# Patient Record
Sex: MALE | Race: WHITE | HISPANIC OR LATINO | Employment: OTHER | URBAN - METROPOLITAN AREA
[De-identification: names, ages, dates, MRNs, and addresses within clinical notes are randomized per-mention and may not be internally consistent; named-entity substitution may affect disease eponyms.]

---

## 2017-01-04 ENCOUNTER — GENERIC CONVERSION - ENCOUNTER (OUTPATIENT)
Dept: OTHER | Facility: OTHER | Age: 62
End: 2017-01-04

## 2017-01-11 ENCOUNTER — ALLSCRIPTS OFFICE VISIT (OUTPATIENT)
Dept: OTHER | Facility: OTHER | Age: 62
End: 2017-01-11

## 2017-03-22 ENCOUNTER — GENERIC CONVERSION - ENCOUNTER (OUTPATIENT)
Dept: OTHER | Facility: OTHER | Age: 62
End: 2017-03-22

## 2017-03-23 ENCOUNTER — GENERIC CONVERSION - ENCOUNTER (OUTPATIENT)
Dept: OTHER | Facility: OTHER | Age: 62
End: 2017-03-23

## 2017-05-03 ENCOUNTER — ALLSCRIPTS OFFICE VISIT (OUTPATIENT)
Dept: OTHER | Facility: OTHER | Age: 62
End: 2017-05-03

## 2017-08-04 ENCOUNTER — ALLSCRIPTS OFFICE VISIT (OUTPATIENT)
Dept: OTHER | Facility: OTHER | Age: 62
End: 2017-08-04

## 2017-08-04 DIAGNOSIS — I10 ESSENTIAL (PRIMARY) HYPERTENSION: ICD-10-CM

## 2017-08-04 DIAGNOSIS — E05.90 THYROTOXICOSIS WITHOUT THYROID STORM: ICD-10-CM

## 2017-08-04 DIAGNOSIS — I50.9 HEART FAILURE (HCC): ICD-10-CM

## 2017-08-04 DIAGNOSIS — N18.6 END STAGE RENAL DISEASE (HCC): ICD-10-CM

## 2017-08-04 DIAGNOSIS — Z99.2 DEPENDENCE ON RENAL DIALYSIS (HCC): ICD-10-CM

## 2017-08-04 DIAGNOSIS — E78.5 HYPERLIPIDEMIA: ICD-10-CM

## 2017-08-04 DIAGNOSIS — Z00.00 ENCOUNTER FOR GENERAL ADULT MEDICAL EXAMINATION WITHOUT ABNORMAL FINDINGS: ICD-10-CM

## 2017-08-04 DIAGNOSIS — I48.91 ATRIAL FIBRILLATION (HCC): ICD-10-CM

## 2017-09-22 ENCOUNTER — HOSPITAL ENCOUNTER (OUTPATIENT)
Dept: RADIOLOGY | Facility: HOSPITAL | Age: 62
Discharge: HOME | End: 2017-09-22
Attending: ANESTHESIOLOGY
Payer: MEDICARE

## 2017-09-22 ENCOUNTER — TRANSCRIBE ORDERS (OUTPATIENT)
Dept: ADMINISTRATIVE | Facility: HOSPITAL | Age: 62
End: 2017-09-22

## 2017-09-22 ENCOUNTER — ALLSCRIPTS OFFICE VISIT (OUTPATIENT)
Dept: OTHER | Facility: OTHER | Age: 62
End: 2017-09-22

## 2017-09-22 DIAGNOSIS — M25.562 PAIN IN LEFT KNEE: ICD-10-CM

## 2017-09-22 DIAGNOSIS — M25.511 PAIN IN RIGHT SHOULDER: ICD-10-CM

## 2017-09-22 DIAGNOSIS — M25.512 PAIN IN LEFT SHOULDER: ICD-10-CM

## 2017-09-22 DIAGNOSIS — M25.561 PAIN IN RIGHT KNEE: ICD-10-CM

## 2017-09-22 PROCEDURE — 73562 X-RAY EXAM OF KNEE 3: CPT

## 2017-09-22 PROCEDURE — 73030 X-RAY EXAM OF SHOULDER: CPT

## 2017-09-25 ENCOUNTER — TRANSCRIBE ORDERS (OUTPATIENT)
Dept: ADMINISTRATIVE | Facility: HOSPITAL | Age: 62
End: 2017-09-25

## 2017-09-25 DIAGNOSIS — M47.816 OSTEOARTHRITIS OF LUMBAR SPINE, UNSPECIFIED SPINAL OSTEOARTHRITIS COMPLICATION STATUS: Primary | ICD-10-CM

## 2017-09-29 ENCOUNTER — GENERIC CONVERSION - ENCOUNTER (OUTPATIENT)
Dept: OTHER | Facility: OTHER | Age: 62
End: 2017-09-29

## 2017-10-02 ENCOUNTER — HOSPITAL ENCOUNTER (OUTPATIENT)
Dept: RADIOLOGY | Facility: HOSPITAL | Age: 62
Discharge: HOME | End: 2017-10-02
Attending: ANESTHESIOLOGY
Payer: MEDICARE

## 2017-10-02 DIAGNOSIS — M47.816 OSTEOARTHRITIS OF LUMBAR SPINE, UNSPECIFIED SPINAL OSTEOARTHRITIS COMPLICATION STATUS: ICD-10-CM

## 2017-10-02 PROCEDURE — 72148 MRI LUMBAR SPINE W/O DYE: CPT

## 2017-10-13 ENCOUNTER — GENERIC CONVERSION - ENCOUNTER (OUTPATIENT)
Dept: OTHER | Facility: OTHER | Age: 62
End: 2017-10-13

## 2017-10-16 ENCOUNTER — ALLSCRIPTS OFFICE VISIT (OUTPATIENT)
Dept: OTHER | Facility: OTHER | Age: 62
End: 2017-10-16

## 2017-10-17 NOTE — PROGRESS NOTES
Assessment  1  Lumbosacral spondylosis with radiculopathy (721 3,724 4) (M47 27)   2  Osteoarthritis of right knee (715 96) (M17 11)   3  Osteoarthritis of right shoulder (715 91) (M19 011)   4  Osteoarthritis of left knee (715 96) (M17 12)    Discussion/Summary    Patient is a 66-year-old male with a history of lumbar spondylosis, lumbar radiculopathy, and bilateral knee pain and right shoulder pain, who presents today for a follow up appointment  The patient is experiencing low back pain which is consistent with multilevel spondylosis which is most significant at L4-L5  To help decrease inflammation and nerve irritation, we can perform a lumbar translaminar epidural steroid injection at L4  The injection and risks were reviewed with the patient  He is currently taking Coumadin, so a Coumadin hold will need to be obtained prior to scheduling the procedure  also receives dialysis on Tuesdays and Thursdays, so the injection will need to be scheduled either Friday  The patient has the current Goals: Decreased pain and improved quality of life  The patent has the current Barriers: None  Patient is able to Self-Care  Chief Complaint  1  Pain    History of Present Illness  Patient is a 66-year-old male with a history of lumbar spondylosis, lumbar radiculopathy, and bilateral knee pain and right shoulder pain  He was last seen in the office on September 22, 2017, which was initial consultation  He was ordered MRI of the lumbar spine along with x-rays of the right shoulder and bilateral knees  He presents today for a follow-up appointment  At this time, the patient continues to experience constant low back pain  He states the pain only occurs when standing or walking  He also feels numbness in the lateral aspects of his thighs, along with knee pain  The left knee is more significant than the right knee  he is currently rating his pain a 6/10 on the numeric rating scale     of bilateral knees showed mild osteoarthritis     EDU CURIEL presents with complaints of gradual onset of moderate bilateral neck, bilateral lower back, bilateral shoulder, bilateral knee and bilateral ankle pain  Review of Systems    Musculoskeletal: difficulty walking  Active Problems  1  Allergic rhinitis (477 9) (J30 9)   2  Anticoagulant long-term use (V58 61) (Z79 01)   3  Arteriosclerosis of coronary artery (414 00) (I25 10)   4  Asthma (493 90) (J45 909)   5  Atrial fibrillation (427 31) (I48 91)   6  Chronic left shoulder pain (719 41,338 29) (M25 512,G89 29)   7  Chronic pain of left knee (572 42,460 13) (M25 562,G89 29)   8  Chronic pain of right knee (492 57,694 66) (M25 561,G89 29)   9  Chronic right shoulder pain (719 41,338 29) (M25 511,G89 29)   10  Chronic sinusitis (473 9) (J32 9)   11  Congestive heart failure (428 0) (I50 9)   12  Dental caries secondary to acquired defects of tooth structure (521 09) (K03 2,K02 9)   13  End stage renal disease (585 6) (N18 6)   14  Facet degeneration of lumbar region (721 3) (M47 816)   15  Fatigue (780 79) (R53 83)   16  Foreign body of right ear, initial encounter (436 2743) (T16 1XXA)   17  Generalized osteoarthritis of multiple sites (715 09) (M15 9)   18  Hemodialysis status (V45 11) (Z99 2)   19  Hyperlipidemia (272 4) (E78 5)   20  Hypertension (401 9) (I10)   21  Hyperthyroidism (242 90) (E05 90)   22  Ischemic cardiomyopathy (414 8) (I25 5)   23  Membranous Glomerulonephritis With Nephritis (583 1)   24  Membranous Nephropathy (583 1)   25  Mitral regurgitation (424 0) (I34 0)   26  Monocular diplopia, right eye (368 2) (H53 2)   27  Nasal septal deviation (470) (J34 2)   28  Obesity (278 00) (E66 9)   29  Sleep apnea (780 57) (G47 30)   30  Tobacco use (305 1) (Z72 0)   31  Trigger finger (727 03) (M65 30)   32  Vitamin D deficiency (268 9) (E55 9)    Past Medical History  1  History of Acute bilateral otitis media (382 9) (H62 93)   2   History of Acute Myocardial Infarction (V12 59)   3  History of Ear discharge, right (388 60) (H92 11)   4  History of acute pancreatitis (V12 79) (Z87 19)   5  History of cholelithiasis (V12 79) (Z87 19)   6  History of Laceration of eye, right (871 4) (S05 31XA)   7  History of Limb swelling (729 81) (M79 89)   8  History of Preoperative clearance (V72 84) (Z01 818)   9  History of Preoperative clearance (V72 84) (Z01 818)   10  History of Ptosis, right eyelid (374 30) (H02 401)   11  History of Right ear pain (388 70) (H92 01)   12  History of Stroke Syndrome (436)    The active problems and past medical history were reviewed and updated today  Surgical History  1  History of Cath Placement Of Stent 1   2  History of Cholecystectomy Laparoscopic   3  History of Hemodialysis Access Type Arteriovenous Fistula   4  History of Hernia Repair   5  History of Parathyroid Resection    The surgical history was reviewed and updated today  Family History  Father    1  Family history of Bacterial Pneumonia   2  Family history of Coronary Artery Disease (V17 49)  Brother    3  Family history of End Stage Renal Disease   4  Family history of Hypertension (V17 49)  Family History    5  Family history of Death In The Family Brother    The family history was reviewed and updated today  Social History   · Current every day smoker (305 1) (F17 200)   · Denied: History of Drug Use   · Marital History -  (V61 03)   · Native Language Frisian   · Never Drank Alcohol   · Occupation:   · Tobacco use (305 1) (Z72 0)  The social history was reviewed and updated today  The social history was reviewed and is unchanged  Current Meds   1  AmLODIPine Besylate 5 MG Oral Tablet; TAKE 1 TABLET DAILY FOR BLOOD   PRESSURE; Therapy: 29IGP7159 to Recorded   2  Arthritis Pain 650 MG Oral Tablet Extended Release; TAKE 1 TABLET Bedtime; Therapy: 93EAO6754 to (Last Rx:95Afb6880) Ordered   3   Aspirin EC 81 MG Oral Tablet Delayed Release; take one tablet by mouth one time daily; Therapy: 75HYN9287 to (Last EL:46AZU4466) Ordered   4  Atorvastatin Calcium 40 MG Oral Tablet; Take 1 tablet by mouth at bedtime; Therapy: 41MQO7544 to (GKODSGOW:41RIC5706)  Requested for: 95ZEB2201; Last   Rx:77Mva2117 Ordered   5  Ergocalciferol 40276 UNIT CAPS; TAKE 1 CAPSULE Weekly; Therapy: 00ROF7116 to Recorded   6  Fluticasone Propionate 50 MCG/ACT Nasal Suspension; USE 2 SPRAYS IN EACH   NOSTRIL ONCE DAILY; Therapy: 59DPP8138 to (Evaluate:26Mqm6758)  Requested for: 11Jun2013; Last   Rx:11Jun2013 Ordered   7  Lisinopril 20 MG Oral Tablet; Take 1 tablet daily; Therapy: 92FHU8806 to Recorded   8  Metoprolol Tartrate 25 MG Oral Tablet; TAKE 1 TABLET TWICE DAILY; Therapy: 79MJR8611 to (Evaluate:17Jan2017); Last Rx:01Spl1519 Ordered   9  Nephrocaps 1 MG Oral Capsule; Therapy: 43LBO7719 to Recorded   10  Oscal 500/200 D-3 500-200 MG-UNIT Oral Tablet; Therapy: 80BDJ4622 to Recorded   11  Phoslyra 667 MG/5ML Oral Solution; Therapy: 20KEJ6138 to Recorded   12  Renvela 800 MG Oral Tablet; Therapy: 27ECW4169 to Recorded   13  Rocaltrol 0 5 MCG Oral Capsule; Therapy: 96TCY2793 to Recorded   14  Sensipar 60 MG Oral Tablet; Therapy: 34HFC8172 to Recorded   15  Warfarin Sodium 5 MG Oral Tablet; take 2 tablet daily; Last JQ:19NAH8165 Ordered    The medication list was reviewed and updated today  Allergies  1  PPD    Vitals  Vital Signs    Recorded: 57DQV7988 02:47PM   Temperature 98 2 F   Heart Rate 76   Respiration 18   Systolic 419   Diastolic 70   Height 5 ft 6 in   Weight 227 lb    BMI Calculated 36 64   BSA Calculated 2 11   O2 Saturation 97   Pain Scale 6     Physical Exam    Constitutional   General appearance: Well developed, well nourished, alert, in no distress, non-toxic and no overt pain behavior  Eyes   Sclera: anicteric   HEENT   Hearing grossly intact  Neck   Neck: Supple, symmetric, trachea midline, no masses      Pulmonary   Respiratory effort: Even and unlabored  Cardiovascular   Examination of extremities: No edema or pitting edema present  Skin   Skin and subcutaneous tissue: Normal without rashes or lesions, well hydrated  Psychiatric   Mood and affect: Mood and affect appropriate  Neurologic   Cranial nerves: Cranial nerves II-XII grossly intact  Musculoskeletal   Gait and station: Normal     Joint Exam: -- (no tenderness bilateral knees to palpation  5/5 strength b/l knees)   Lumbar/Sacral Spine examination demonstrates Lumbosacral Spine:   Appearance: Normal  Spinal alignment exhibits normal lordosis  Tenderness: None  Flexion was restricted-- and-- was painless  Extension was restricted-- and-- was painful  Left lateral flexion was restricted-- and-- was painful  Right lateral flexion was restricted-- and-- was painful  Foot and ankle strength was normal bilaterally  Knee strength was normal bilaterally  Hip strength was normal bilaterally  Results/Data  Results Free Text Form [facility]:   Results    I personally reviewed the films/images in the office today  MRI:  MRI LUMBAR SPINE WITHOUT CONTRAST dated 10/2/17    INDICATION: Osteoarthritis, spondylosis without myelopathy or radiculopathy    COMPARISON: None  TECHNIQUE: Sagittal T1, sagittal T2, sagittal inversion recovery, axial T1 and axial T2, coronal T2     IMAGE QUALITY: Diagnostic    FINDINGS:    ALIGNMENT: Minor degenerative anterolisthesis of L4  MARROW SIGNAL: Marrow signal distinctly abnormal with the superior endplate deformities present at T12, L1 and L2  There is a small amount of marrow edema associated with the T12-L1 fracture suggesting these are evolving  Overall marrow signal  demonstrates some mild sclerosis at the endplates consistent with renal disease  DISTAL CORD AND CONUS: Normal size and signal within the distal cord and conus  The conus ends at the L1 level      PARASPINAL SOFT TISSUES: Paraspinal soft tissues are unremarkable  SACRUM: Renal contours difficult to identify owing to the numerous bilateral renal cysts of varying size  Findings are consistent with polycystic kidney disease yet this was not described on a CT from 2005  Patient is on dialysis  Only as clinically  indicated, kidneys could be imaged with CT     LOWER THORACIC DISC SPACES: Normal disc height and signal  No disc herniation, canal stenosis or foraminal narrowing  LUMBAR DISC SPACES:    L1-L2: Minor facet arthrosis superior endplate L2 Schmorl's node    L2-L3: Minor facet arthrosis, minor circumferential bulge, minor depression of superior L3 endplate    D9-P7: Circumferential bulge, bilateral facet arthrosis  L4-L5: Moderately advanced facet arthrosis with minor anterolisthesis  No definite root compression  L5-S1: Circumferential bulge, marginal osteophytes  IMPRESSION:    No compressive cord or root disease  Appearance of the spine in keeping with hemodialysis  Superior endplate deformities P68, L1 and L2, minor associated marrow edema at T12 L1 suggesting evolving Schmorl's nodes  Polycystic kidney disease  Directed CT Imaging of the kidneys could be performed only as deemed clinically appropriate      Attending Note  Collaborating Physician: I discussed the case with the Advanced Practitioner and reviewed the note-- and-- I agree with the Advanced Practitioner note        Future Appointments    Date/Time Provider Specialty Site   12/15/2017 11:00 AM Sherlyn Segal MD Family [facility]     Signatures   Electronically signed by : Isaura Quach [address] [place] [address]; Oct 16 2017  3:36PM EST                       (Author)    Electronically signed by : Katheryn Juarez MD; Oct 16 2017  3:42PM EST                       (Author)

## 2017-10-19 ENCOUNTER — GENERIC CONVERSION - ENCOUNTER (OUTPATIENT)
Dept: OTHER | Facility: OTHER | Age: 62
End: 2017-10-19

## 2017-10-25 ENCOUNTER — GENERIC CONVERSION - ENCOUNTER (OUTPATIENT)
Dept: OTHER | Facility: OTHER | Age: 62
End: 2017-10-25

## 2017-11-01 ENCOUNTER — GENERIC CONVERSION - ENCOUNTER (OUTPATIENT)
Dept: OTHER | Facility: OTHER | Age: 62
End: 2017-11-01

## 2017-11-02 DIAGNOSIS — Z79.01 LONG TERM CURRENT USE OF ANTICOAGULANT: ICD-10-CM

## 2017-11-03 ENCOUNTER — ALLSCRIPTS OFFICE VISIT (OUTPATIENT)
Dept: RADIOLOGY | Facility: CLINIC | Age: 62
End: 2017-11-03
Payer: MEDICARE

## 2017-12-08 ENCOUNTER — ALLSCRIPTS OFFICE VISIT (OUTPATIENT)
Dept: OTHER | Facility: OTHER | Age: 62
End: 2017-12-08

## 2018-01-10 NOTE — MISCELLANEOUS
Message   Recorded as Task   Date: 10/13/2017 02:02 PM, Created By: Florin Jolley   Task Name: Follow Up   Assigned To: [facility] clerical,Team   Regarding Patient: Vincent Mendoza, Status: In Progress   Annabellediane Niño - 13 Oct 2017 2:02 PM     TASK CREATED  called pt to go over MRI results but he had a hard time understanding what i was saying    please schedule an SOVS with Elli Cruz to go over MRI results and discuss a treatment plan   Destinee Stuart - 13 Oct 2017 2:04 PM     TASK REASSIGNED: Previously Assigned To [address] - 13 Oct 2017 2:42 PM     TASK IN [address] [place] [address] - 13 Oct 2017 2:45 PM     TASK EDITED  Pt is scheduled w/Jessica for 10/16/17 @ 2:30  Active Problems    1  Allergic rhinitis (477 9) (J30 9)   2  Anticoagulant long-term use (V58 61) (Z79 01)   3  Arteriosclerosis of coronary artery (414 00) (I25 10)   4  Asthma (493 90) (J45 909)   5  Atrial fibrillation (427 31) (I48 91)   6  Chronic left shoulder pain (719 41,338 29) (M25 512,G89 29)   7  Chronic pain of left knee (806 34,301 57) (M25 562,G89 29)   8  Chronic pain of right knee (954 80,096 37) (M25 561,G89 29)   9  Chronic right shoulder pain (719 41,338 29) (M25 511,G89 29)   10  Chronic sinusitis (473 9) (J32 9)   11  Congestive heart failure (428 0) (I50 9)   12  Dental caries secondary to acquired defects of tooth structure (521 09) (K03 2,K02 9)   13  End stage renal disease (585 6) (N18 6)   14  Facet degeneration of lumbar region (721 3) (M47 816)   15  Fatigue (780 79) (R53 83)   16  Foreign body of right ear, initial encounter (436 2743) (T16 1XXA)   17  Generalized osteoarthritis of multiple sites (715 09) (M15 9)   18  Hemodialysis status (V45 11) (Z99 2)   19  Hyperlipidemia (272 4) (E78 5)   20  Hypertension (401 9) (I10)   21  Hyperthyroidism (242 90) (E05 90)   22  Ischemic cardiomyopathy (414 8) (I25 5)   23  Membranous Glomerulonephritis With Nephritis (583 1)   24  Membranous Nephropathy (583 1)   25  Mitral regurgitation (424 0) (I34 0)   26  Monocular diplopia, right eye (368 2) (H53 2)   27  Nasal septal deviation (470) (J34 2)   28  Obesity (278 00) (E66 9)   29  Sleep apnea (780 57) (G47 30)   30  Tobacco use (305 1) (Z72 0)   31  Trigger finger (727 03) (M65 30)   32  Vitamin D deficiency (268 9) (E55 9)    Current Meds   1  AmLODIPine Besylate 5 MG Oral Tablet; TAKE 1 TABLET DAILY FOR BLOOD   PRESSURE; Therapy: 35MHV6793 to Recorded   2  Arthritis Pain 650 MG Oral Tablet Extended Release; TAKE 1 TABLET Bedtime; Therapy: 04DJU3532 to (Last Rx:98Epf0607) Ordered   3  Aspirin EC 81 MG Oral Tablet Delayed Release; take one tablet by mouth one time daily; Therapy: 34ELW4550 to (Last LH:86YMN8340) Ordered   4  Atorvastatin Calcium 40 MG Oral Tablet; Take 1 tablet by mouth at bedtime; Therapy: 24FQE0890 to (SVNTKQGI:67WAJ4780)  Requested for: 93KPN4303; Last   Rx:86Erh9165 Ordered   5  Ergocalciferol 21928 UNIT CAPS; TAKE 1 CAPSULE Weekly; Therapy: 24WAV7773 to Recorded   6  Fluticasone Propionate 50 MCG/ACT Nasal Suspension (Flonase); USE 2 SPRAYS IN   EACH NOSTRIL ONCE DAILY; Therapy: 86KJN2370 to (Evaluate:08Rhs2787)  Requested for: 11Jun2013; Last   Rx:11Jun2013 Ordered   7  Lisinopril 20 MG Oral Tablet; Take 1 tablet daily; Therapy: 49RMT9168 to Recorded   8  Metoprolol Tartrate 25 MG Oral Tablet; TAKE 1 TABLET TWICE DAILY; Therapy: 31PEC3659 to (Evaluate:17Jan2017); Last Rx:19Oct2016 Ordered   9  Nephrocaps 1 MG Oral Capsule; Therapy: 48EZD8035 to Recorded   10  Oscal 500/200 D-3 500-200 MG-UNIT Oral Tablet; Therapy: 24BLU5104 to Recorded   11  Phoslyra 667 MG/5ML Oral Solution; Therapy: 27BHT8860 to Recorded   12  Renvela 800 MG Oral Tablet (Sevelamer Carbonate); Therapy: 25YTQ4777 to Recorded   13  Rocaltrol 0 5 MCG Oral Capsule (Calcitriol); Therapy: 44REP6653 to Recorded   14  Sensipar 60 MG Oral Tablet;     Therapy: 40WSQ2844 to Recorded   15  Warfarin Sodium 5 MG Oral Tablet; take 2 tablet daily; Last RP:17ZTA2153 Ordered    Allergies    1   PPD    Signatures   Electronically signed by : Edgard Jang, ; Oct 13 2017  2:46PM EST                       (Author)

## 2018-01-12 VITALS
DIASTOLIC BLOOD PRESSURE: 66 MMHG | OXYGEN SATURATION: 98 % | HEIGHT: 66 IN | SYSTOLIC BLOOD PRESSURE: 114 MMHG | WEIGHT: 228 LBS | BODY MASS INDEX: 36.64 KG/M2 | TEMPERATURE: 97.8 F | RESPIRATION RATE: 18 BRPM | HEART RATE: 74 BPM

## 2018-01-13 VITALS
WEIGHT: 234.25 LBS | BODY MASS INDEX: 37.65 KG/M2 | HEART RATE: 88 BPM | HEIGHT: 66 IN | RESPIRATION RATE: 20 BRPM | SYSTOLIC BLOOD PRESSURE: 110 MMHG | TEMPERATURE: 97.1 F | DIASTOLIC BLOOD PRESSURE: 60 MMHG

## 2018-01-13 VITALS
HEART RATE: 72 BPM | SYSTOLIC BLOOD PRESSURE: 112 MMHG | HEIGHT: 66 IN | DIASTOLIC BLOOD PRESSURE: 64 MMHG | BODY MASS INDEX: 37.32 KG/M2 | WEIGHT: 232.2 LBS

## 2018-01-13 VITALS
BODY MASS INDEX: 36.87 KG/M2 | HEART RATE: 72 BPM | HEIGHT: 66 IN | DIASTOLIC BLOOD PRESSURE: 60 MMHG | WEIGHT: 229.4 LBS | SYSTOLIC BLOOD PRESSURE: 110 MMHG

## 2018-01-13 NOTE — MISCELLANEOUS
Message   Recorded as Task   Date: 10/31/2017 03:12 PM, Created By: Marcus Corbin   Task Name: Miscellaneous   Assigned To: Edilma Simons clinical,Team   Regarding Patient: Orlando Plummer, Status: Active   Comment:    Marcus Corbin - 31 Oct 2017 3:12 PM     TASK CREATED  Melvin Shaffer from [address] [place] [address] Dr richard stating that pt brought in a script for bloodwork and they neeru PT with INR today  Pt is scheduled for procedure on Friday (LESI @L4,COUMADIN,NO ASA HOLD PER FQ)  The order says to draw on  when the pt is at dialysis  Is it necessary for him to go back and have it drawn then or will the results from today be sufficient  She will have the results faxed over tomorrow  She can be reached at [phone]  Can speak with any of the nurses there  Hellen Arndt - 31 Oct 2017 4:25 PM     TASK EDITED    Given to:             [facility]    Reason: ROUTINE/OFFICE   Pt's Dr: Ashutosh Doss       For: OFFICE     2nd Call: NO        From: [address] [place] [address]     Phone: [phone]   Ext:     Pt Name: Gurdeep CURIEL    Pt : 1955     Message: NEED TO KNOW WHO SHE JUST SPOKE WITH REG LAB WORK PLS C/B    returned call to Melvin Shaffer to advise who she had spoken with earlier  Nael Bell - 38 Oct 2017 4:28 PM     TASK REASSIGNED: Previously Assigned To Yani Check - 2017 10:21 AM     TASK EDITED  S/W Beatris at [facility], I asked her to fax me the PT/INR that was drawn yest at their center  I explained that I will forward results to Dr Raoul Philip and based on the results maybe they might not have to repeat the PT/INR on Thurs when he is there for dialysis  I asked to have results faxed to [phone] the fax # at the [facility] office where I am working today  Told Beatris I will c/b and let them know if the PT/INR needs to be done again on 17  PT/INR from 10/31/17: PT12 2, INR 0 93  Results scanned into chart    Pt is on coumadin (but non-compliant) and is sched for LESI on 11/3/17  Pls advise  Mg David - 01 Nov 2017 12:17 PM     TASK REPLIED TO: Previously Assigned To Mg David  yes can continue with scheduled procedure since he's off his coumadin anyway now and had normal INR   NarcisoDestinee - 01 Nov 2017 1:44 PM     TASK EDITED  I informed Beatris at [facility] that the PT/INR does not need to be repeated on Thurs since pt had a normal INR  Active Problems    1  Allergic rhinitis (477 9) (J30 9)   2  Anticoagulant long-term use (V58 61) (Z79 01)   3  Arteriosclerosis of coronary artery (414 00) (I25 10)   4  Asthma (493 90) (J45 909)   5  Atrial fibrillation (427 31) (I48 91)   6  Chronic left shoulder pain (719 41,338 29) (M25 512,G89 29)   7  Chronic pain of left knee (801 80,615 63) (M25 562,G89 29)   8  Chronic pain of right knee (011 86,362 02) (M25 561,G89 29)   9  Chronic right shoulder pain (719 41,338 29) (M25 511,G89 29)   10  Chronic sinusitis (473 9) (J32 9)   11  Congestive heart failure (428 0) (I50 9)   12  Dental caries secondary to acquired defects of tooth structure (521 09) (K03 2,K02 9)   13  End stage renal disease (585 6) (N18 6)   14  Facet degeneration of lumbar region (721 3) (M47 816)   15  Fatigue (780 79) (R53 83)   16  Foreign body of right ear, initial encounter (436 2743) (T16 1XXA)   17  Generalized osteoarthritis of multiple sites (715 09) (M15 9)   18  Hemodialysis status (V45 11) (Z99 2)   19  Hyperlipidemia (272 4) (E78 5)   20  Hypertension (401 9) (I10)   21  Hyperthyroidism (242 90) (E05 90)   22  Ischemic cardiomyopathy (414 8) (I25 5)   23  Knee osteomyelitis, left (730 26) (M86 9)   24  Lumbosacral spondylosis with radiculopathy (721 3,724 4) (M47 27)   25  Membranous Glomerulonephritis With Nephritis (583 1)   26  Membranous Nephropathy (583 1)   27  Mitral regurgitation (424 0) (I34 0)   28  Monocular diplopia, right eye (368 2) (H53 2)   29  Nasal septal deviation (470) (J34 2)   30   Obesity (278 00) (E66 9)   31  Osteoarthritis of left knee (715 96) (M17 12)   32  Osteoarthritis of right knee (715 96) (M17 11)   33  Osteoarthritis of right shoulder (715 91) (M19 011)   34  Sleep apnea (780 57) (G47 30)   35  Tobacco use (305 1) (Z72 0)   36  Trigger finger (727 03) (M65 30)   37  Vitamin D deficiency (268 9) (E55 9)    Current Meds   1  AmLODIPine Besylate 5 MG Oral Tablet; TAKE 1 TABLET DAILY FOR BLOOD   PRESSURE; Therapy: 38JOU4076 to Recorded   2  Arthritis Pain 650 MG Oral Tablet Extended Release; TAKE 1 TABLET Bedtime; Therapy: 30ZBM7665 to (Last Rx:13Swo2071) Ordered   3  Aspirin EC 81 MG Oral Tablet Delayed Release; take one tablet by mouth one time daily; Therapy: 05XRF4185 to (Last GK:84GQF5258) Ordered   4  Atorvastatin Calcium 40 MG Oral Tablet; Take 1 tablet by mouth at bedtime; Therapy: 64NBK5083 to (VBJBUBQZ:33MQB4681)  Requested for: 12KPE3267; Last   Rx:36Lvr3656 Ordered   5  Ergocalciferol 78694 UNIT CAPS; TAKE 1 CAPSULE Weekly; Therapy: 53BJO9011 to Recorded   6  Fluticasone Propionate 50 MCG/ACT Nasal Suspension (Flonase); USE 2 SPRAYS IN   EACH NOSTRIL ONCE DAILY; Therapy: 20VHI6890 to (Evaluate:68Say1297)  Requested for: 11Jun2013; Last   Rx:11Jun2013 Ordered   7  Lisinopril 20 MG Oral Tablet; Take 1 tablet daily; Therapy: 09XPZ0217 to Recorded   8  Metoprolol Tartrate 25 MG Oral Tablet; TAKE 1 TABLET TWICE DAILY; Therapy: 72QPH5840 to (Evaluate:17Jan2017); Last Rx:19Oct2016 Ordered   9  Nephrocaps 1 MG Oral Capsule; Therapy: 79ZXK9394 to Recorded   10  Oscal 500/200 D-3 500-200 MG-UNIT Oral Tablet; Therapy: 55FXS8621 to Recorded   11  Phoslyra 667 MG/5ML Oral Solution; Therapy: 47PPJ1095 to Recorded   12  Renvela 800 MG Oral Tablet (Sevelamer Carbonate); Therapy: 68UIA3938 to Recorded   13  Rocaltrol 0 5 MCG Oral Capsule (Calcitriol); Therapy: 14OIR5301 to Recorded   14  Sensipar 60 MG Oral Tablet; Therapy: 80XKR3553 to Recorded   15   Warfarin Sodium 5 MG Oral Tablet; take 2 tablet daily; Last BK:55QRD2899 Ordered    Allergies    1   PPD    Signatures   Electronically signed by : Rachel Sifuentes, ; Nov 1 2017  1:44PM EST                       (Author)

## 2018-01-14 VITALS
RESPIRATION RATE: 18 BRPM | HEIGHT: 66 IN | DIASTOLIC BLOOD PRESSURE: 70 MMHG | OXYGEN SATURATION: 97 % | SYSTOLIC BLOOD PRESSURE: 118 MMHG | BODY MASS INDEX: 36.48 KG/M2 | WEIGHT: 227 LBS | TEMPERATURE: 98.2 F | HEART RATE: 76 BPM

## 2018-01-15 NOTE — MISCELLANEOUS
Message   Recorded as Task   Date: 10/19/2017 08:57 AM, Created By: Fantasma Reeves   Task Name: Call Back   Assigned To: Anti Renee CLOUD,TEAM   Regarding Patient: Donavon Fernandez, Status: Active   CommentDella Didier - 19 Oct 2017 8:57 AM     TASK CREATED  SPA Call Center- Patient called stating that Q06 wanted the number to Dr Rob Mendenhall [phone] to s/w this doctor to ask him questions in regards to patient having any heart problems  Would like someone to call him back after Q06 s/w Dr Rob Mendenhall to schedule an appt  c/b [phone]   Lazaro Patton - 19 Oct 2017 10:53 AM     TASK EDITED  Please advise  Thanks  Marvin Pulido - 49 Oct 2017 12:15 PM     TASK REASSIGNED: Previously Assigned To Marvin Pulido  this is for an aspirin hold   [facility] - 19 Oct 2017 1:10 PM     TASK EDITED  Coumadin and ASA hold form faxed to Dr Rob Mendenhall at [facility]  Fax # [phone]  Pt informed we faxed form to his cardiologist, nurse will call him once approval obtained to sched the inj     *pt has dialysis on [facility] [address] [facility], pt needs LESI @ L4 scheduled on a Friday once approval obtained  Harika Mcleod - 24 Oct 2017 3:53 PM     TASK EDITED  Anti Coag form scanned into patient's chart   Destinee Stuart - 25 Oct 2017 7:54 AM     TASK EDITED  We received a faxed note dated 10/23/17 from Dr Rob Mendenhall from [facility] regarding Coumadin & ASA hold for procedure, note states "Moderate risk of stroke & he is largely noncompliant w/ coumadin & INR is chronically subtherapeutic  Can hold his coumadin for 5 days  Would strongly prefer for him to remain on ASA given Hx of PCI  If absolutely necessary to stop, he may stop ASA for 6 days & resume both as soon as safe from procedure standpoint "    Pls advise     Marvin Pulido - 25 Oct 2017 8:19 AM     TASK REPLIED TO: Previously Assigned To Marvin Pulido  ok for him to stay on aspirin for procedure   Destinee Stuart - 25 Oct 2017 9:57 AM     TASK EDITED  S/W pt and scheduled his LESI @ L4 for Friday 11/3/17 at 12:45 at [facility]  Arrival time is 12:30,  needed, NPO 1 Hr prior to procedure, c/b needed if sick/abx started prior to opro, wear sweat pants, ASA is not to be held per Dr Harris Tapia and Dr Eliza Winn, Coumadin to be stopped on 10/29/17, PT/INR will be drawn at [address] [facility] on 11/2/17  Told pt I would mail him copy of PT/INR script to take to his Dialysis Center  Told pt I would also contact the Dialysis center about PT/INR Script  [address] [place] [address] Loop  ph # [phone]  fax # [phone]    S/W Zee Locke at [address] [facility] about PT/INR that will need to be drawn on 11/2/17 and sent STAT for inj on 11/3/17  I asked to have results faxed to our new [address] fax # which I provided and included on script  PT/INR script faxed to [facility]  PT/INR script mailed to pt  *Zee Locke wanted to make us aware that pt is non-compliant taking his coumadin and his INR is never >1 2Darryl Crea - 25 Oct 2017 11:05 AM     TASK REPLIED TO: Previously Assigned To Beth jOeda md aware        Active Problems    1  Allergic rhinitis (477 9) (J30 9)   2  Anticoagulant long-term use (V58 61) (Z79 01)   3  Arteriosclerosis of coronary artery (414 00) (I25 10)   4  Asthma (493 90) (J45 909)   5  Atrial fibrillation (427 31) (I48 91)   6  Chronic left shoulder pain (719 41,338 29) (M25 512,G89 29)   7  Chronic pain of left knee (042 83,358 03) (M25 562,G89 29)   8  Chronic pain of right knee (038 74,261 93) (M25 561,G89 29)   9  Chronic right shoulder pain (719 41,338 29) (M25 511,G89 29)   10  Chronic sinusitis (473 9) (J32 9)   11  Congestive heart failure (428 0) (I50 9)   12  Dental caries secondary to acquired defects of tooth structure (521 09) (K03 2,K02 9)   13  End stage renal disease (585 6) (N18 6)   14  Facet degeneration of lumbar region (721 3) (M47 816)   15  Fatigue (780 79) (R53 83)   16   Foreign body of right ear, initial encounter (263,W606) (T16 1XXA)   17  Generalized osteoarthritis of multiple sites (715 09) (M15 9)   18  Hemodialysis status (V45 11) (Z99 2)   19  Hyperlipidemia (272 4) (E78 5)   20  Hypertension (401 9) (I10)   21  Hyperthyroidism (242 90) (E05 90)   22  Ischemic cardiomyopathy (414 8) (I25 5)   23  Knee osteomyelitis, left (730 26) (M86 9)   24  Lumbosacral spondylosis with radiculopathy (721 3,724 4) (M47 27)   25  Membranous Glomerulonephritis With Nephritis (583 1)   26  Membranous Nephropathy (583 1)   27  Mitral regurgitation (424 0) (I34 0)   28  Monocular diplopia, right eye (368 2) (H53 2)   29  Nasal septal deviation (470) (J34 2)   30  Obesity (278 00) (E66 9)   31  Osteoarthritis of left knee (715 96) (M17 12)   32  Osteoarthritis of right knee (715 96) (M17 11)   33  Osteoarthritis of right shoulder (715 91) (M19 011)   34  Sleep apnea (780 57) (G47 30)   35  Tobacco use (305 1) (Z72 0)   36  Trigger finger (727 03) (M65 30)   37  Vitamin D deficiency (268 9) (E55 9)    Current Meds   1  AmLODIPine Besylate 5 MG Oral Tablet; TAKE 1 TABLET DAILY FOR BLOOD   PRESSURE; Therapy: 01FVY2407 to Recorded   2  Arthritis Pain 650 MG Oral Tablet Extended Release; TAKE 1 TABLET Bedtime; Therapy: 31IRF2423 to (Last Rx:86Qgm3201) Ordered   3  Aspirin EC 81 MG Oral Tablet Delayed Release; take one tablet by mouth one time daily; Therapy: 31MCS9360 to (Last TL:09AUA5625) Ordered   4  Atorvastatin Calcium 40 MG Oral Tablet; Take 1 tablet by mouth at bedtime; Therapy: 09ADB1141 to ([id number])  Requested for: 32RZT1463; Last   Rx:10Rhx7350 Ordered   5  Ergocalciferol 28936 UNIT CAPS; TAKE 1 CAPSULE Weekly; Therapy: 58PVF8021 to Recorded   6  Fluticasone Propionate 50 MCG/ACT Nasal Suspension (Flonase); USE 2 SPRAYS IN   EACH NOSTRIL ONCE DAILY; Therapy: 84XFB5140 to (Evaluate:45Bsf9803)  Requested for: 2013; Last   Rx:2013 Ordered   7  Lisinopril 20 MG Oral Tablet; Take 1 tablet daily;    Therapy: 17OZN3154 to Recorded   8  Metoprolol Tartrate 25 MG Oral Tablet; TAKE 1 TABLET TWICE DAILY; Therapy: 38BPE0317 to (Evaluate:17Jan2017); Last Rx:19Oct2016 Ordered   9  Nephrocaps 1 MG Oral Capsule; Therapy: 58RCP4709 to Recorded   10  Oscal 500/200 D-3 500-200 MG-UNIT Oral Tablet; Therapy: 37TSN8030 to Recorded   11  Phoslyra 667 MG/5ML Oral Solution; Therapy: 80ZXU4013 to Recorded   12  Renvela 800 MG Oral Tablet (Sevelamer Carbonate); Therapy: 58XLX4522 to Recorded   13  Rocaltrol 0 5 MCG Oral Capsule (Calcitriol); Therapy: 31WMG8347 to Recorded   14  Sensipar 60 MG Oral Tablet; Therapy: 14BRW0059 to Recorded   15  Warfarin Sodium 5 MG Oral Tablet; take 2 tablet daily; Last ER:14TNS6325 Ordered    Allergies    1   PPD    Signatures   Electronically signed by : Nelson Hill, ; Oct 25 2017 12:52PM EST                       (Author)

## 2018-01-17 NOTE — MISCELLANEOUS
Message   Recorded as Task   Date: 09/27/2017 08:41 PM, Created By: Leonel Ahumada   Task Name: Follow Up   Assigned To: Dale Hernandez   Regarding Patient: Ashlee Myrick, Status: Active   CommentMarline Edith - 27 Sep 2017 8:41 PM     TASK CREATED  please let pt know x-rays showed mild arthritis in both hips and right shoulder  Lazaro Patton - 28 Sep 2017 8:32 AM     TASK EDITED  [facility] with c/b # location and office hours  Lazaro Patton - 28 Sep 2017 8:32 AM     TASK EDITED   [address] [facility] [address] - 28 Sep 2017 2:46 PM     TASK EDITED  Pt informed of the x-ray results  Pt wanted you to know he is having his MRI on 10/2 at [facility]  I told pt that  will call him with the MRI results  Rhett Barbosaooq - 28 Sep 2017 8:53 PM     TASK REPLIED TO: Previously Assigned To Leonel Ahumada md aware        Active Problems    1  Allergic rhinitis (477 9) (J30 9)   2  Anticoagulant long-term use (V58 61) (Z79 01)   3  Arteriosclerosis of coronary artery (414 00) (I25 10)   4  Asthma (493 90) (J45 909)   5  Atrial fibrillation (427 31) (I48 91)   6  Chronic left shoulder pain (719 41,338 29) (M25 512,G89 29)   7  Chronic pain of left knee (007 32,317 72) (M25 562,G89 29)   8  Chronic pain of right knee (300 96,304 13) (M25 561,G89 29)   9  Chronic right shoulder pain (719 41,338 29) (M25 511,G89 29)   10  Chronic sinusitis (473 9) (J32 9)   11  Congestive heart failure (428 0) (I50 9)   12  Dental caries secondary to acquired defects of tooth structure (521 09) (K03 2,K02 9)   13  End stage renal disease (585 6) (N18 6)   14  Facet degeneration of lumbar region (721 3) (M47 816)   15  Fatigue (780 79) (R53 83)   16  Foreign body of right ear, initial encounter (436 4933) (T16 1XXA)   17  Generalized osteoarthritis of multiple sites (715 09) (M15 9)   18  Hemodialysis status (V45 11) (Z99 2)   19  Hyperlipidemia (272 4) (E78 5)   20  Hypertension (401 9) (I10)   21   Hyperthyroidism (242 90) (E05 90)   22  Ischemic cardiomyopathy (414 8) (I25 5)   23  Membranous Glomerulonephritis With Nephritis (583 1)   24  Membranous Nephropathy (583 1)   25  Mitral regurgitation (424 0) (I34 0)   26  Monocular diplopia, right eye (368 2) (H53 2)   27  Nasal septal deviation (470) (J34 2)   28  Obesity (278 00) (E66 9)   29  Sleep apnea (780 57) (G47 30)   30  Tobacco use (305 1) (Z72 0)   31  Trigger finger (727 03) (M65 30)   32  Vitamin D deficiency (268 9) (E55 9)    Current Meds   1  AmLODIPine Besylate 5 MG Oral Tablet; TAKE 1 TABLET DAILY FOR BLOOD   PRESSURE; Therapy: 41VTT1838 to Recorded   2  Arthritis Pain 650 MG Oral Tablet Extended Release; TAKE 1 TABLET Bedtime; Therapy: 80VCA0611 to (Last Rx:79Svt9574) Ordered   3  Aspirin EC 81 MG Oral Tablet Delayed Release; take one tablet by mouth one time daily; Therapy: 57WWI6046 to (Last FS:02BFJ3174) Ordered   4  Atorvastatin Calcium 40 MG Oral Tablet; Take 1 tablet by mouth at bedtime; Therapy: 21HBS2709 to (CMENTOLR:48YZO3299)  Requested for: 31AVJ3644; Last   Rx:88Ojx0756 Ordered   5  Ergocalciferol 15596 UNIT CAPS; TAKE 1 CAPSULE Weekly; Therapy: 96OXF8328 to Recorded   6  Fluticasone Propionate 50 MCG/ACT Nasal Suspension (Flonase); USE 2 SPRAYS IN   EACH NOSTRIL ONCE DAILY; Therapy: 79AZP6378 to (Evaluate:90Woz6000)  Requested for: 11Jun2013; Last   Rx:11Jun2013 Ordered   7  Lisinopril 20 MG Oral Tablet; Take 1 tablet daily; Therapy: 79XKT1086 to Recorded   8  Metoprolol Tartrate 25 MG Oral Tablet; TAKE 1 TABLET TWICE DAILY; Therapy: 76IVE5571 to (Evaluate:17Jan2017); Last Rx:19Oct2016 Ordered   9  Nephrocaps 1 MG Oral Capsule; Therapy: 29FIM1928 to Recorded   10  Oscal 500/200 D-3 500-200 MG-UNIT Oral Tablet; Therapy: 50RXF9333 to Recorded   11  Phoslyra 667 MG/5ML Oral Solution; Therapy: 89DZC0952 to Recorded   12  Renvela 800 MG Oral Tablet (Sevelamer Carbonate); Therapy: 00GJI2721 to Recorded   13   Rocaltrol 0 5 MCG Oral Capsule (Calcitriol); Therapy: 15ZEN3137 to Recorded   14  Sensipar 60 MG Oral Tablet; Therapy: 52CDW7179 to Recorded   15  Warfarin Sodium 5 MG Oral Tablet; take 2 tablet daily; Last GK:95KFC9555 Ordered    Allergies    1   PPD    Signatures   Electronically signed by : Carlo Rico, ; Sep 29 2017  8:03AM EST                       (Author)

## 2018-01-17 NOTE — MISCELLANEOUS
Discussion/Summary  Discussion Summary:   Pt didn't show up for his appointment  History of Present Illness  TCM Communication [facility]: The patient is being contacted for [address]  He was hospitalized at and [facility]  The dates of hospitalization:, date of admission: 06/06/2016, date of discharge: 06/07/2016  Diagnosis: CHEST PAIN  He was discharged to home  He scheduled a follow up appointment  Follow-up appointments with other specialists: 06/14/2016 AT 9:30 AM Leila Perez MD    Communication performed and completed by [facility] , 06/07/2016      Active Problems    1  Allergic rhinitis (477 9) (J30 9)   2  Asthma (493 90) (J45 909)   3  Atrial fibrillation (427 31) (I48 91)   4  Congestive heart failure (428 0) (I50 9)   5  Coronary artery disease (414 00) (I25 10)   6  End stage renal disease (585 6) (N18 6)   7  Hyperlipidemia (272 4) (E78 5)   8  Hypertension (401 9) (I10)   9  Hyperthyroidism (242 90) (E05 90)   10  Ischemic cardiomyopathy (414 8) (I25 5)   11  Limb swelling (729 81) (M79 89)   12  Membranous Glomerulonephritis With Nephritis (583 1)   13  Membranous Nephropathy (583 1)   14  Mitral regurgitation (424 0) (I34 0)   15  Obesity (278 00) (E66 9)   16  Renal failure (586) (N19)   17  Tobacco use (305 1) (Z72 0)   18  Trigger finger (727 03) (M65 30)    Past Medical History    1  History of Acute Myocardial Infarction (V12 59)   2  History of Stroke Syndrome (436)    Surgical History    1  History of Cath Placement Of Stent 1   2  History of Hemodialysis Access Type Arteriovenous Fistula   3  History of Hernia Repair    Family History  Father    1  Family history of Bacterial Pneumonia   2  Family history of Coronary Artery Disease (V17 49)  Brother    3  Family history of End Stage Renal Disease   4  Family history of Hypertension (V17 49)  Family History    5   Family history of Death In The Family Brother    Social History    · Current Every Day Smoker (305 1)   · Denied: History of Drug Use   · Marital History -  (V61 03)   · Native Language Vietnamese   · Never Drank Alcohol   · Occupation:   · Tobacco use (305 1) (Z72 0)    Current Meds   1  Aspirin  MG Oral Tablet Delayed Release; Therapy: 35XPO8614 to Recorded   2  Cozaar 50 MG Oral Tablet; Therapy: 30XWD1210 to Recorded   3  Fluticasone Propionate 50 MCG/ACT Nasal Suspension; USE 2 SPRAYS IN EACH   NOSTRIL ONCE DAILY; Therapy: 96VWJ0037 to (Evaluate:17Jpw3463)  Requested for: 11Jun2013; Last   Rx:11Jun2013 Ordered   4  Lopressor 50 MG Oral Tablet; Therapy: 45WCE1960 to Recorded   5  Nephrocaps 1 MG Oral Capsule; Therapy: 75KJE4677 to Recorded   6  Oscal 500/200 D-3 500-200 MG-UNIT Oral Tablet; Therapy: 71TEO8861 to Recorded   7  Phoslyra 667 MG/5ML Oral Solution; Therapy: 79JWY1292 to Recorded   8  Renvela 800 MG Oral Tablet; Therapy: 57PRG2745 to Recorded   9  Rocaltrol 0 5 MCG Oral Capsule; Therapy: 14PSE4452 to Recorded   10  Sensipar 60 MG Oral Tablet; Therapy: 93OQV4194 to Recorded    Allergies    1   PPD    Signatures   Electronically signed by : Monie Beavers MD; Nahun 15 2016 12:13PM EST                       (Author)

## 2018-03-07 NOTE — PROCEDURES
Procedure      Pre-procedure Diagnosis: Lumbar spondylosis with radiculopathy  Post-procedure Diagnosis: Lumbar spondylosis with radiculopathy  Procedure Title(s):  1  L4 interlaminar epidural steroid injection      2  Intraoperative fluoroscopy  Attending Surgeon:   Silas Bettencourt MD  Anesthesia:   Local     Indications: The patient is a 58year-old male with a diagnosis of lumbar spondylosis with radiculopathy  The patient's history and physical exam were reviewed  The risks, benefits and alternatives to the procedure were discussed, and all questions were answered to the patient's satisfaction  The patient agreed to proceed, and written informed consent was obtained  Procedure in Detail: The patient was brought into the procedure room and placed in the prone position on the fluoroscopy table  The area of the lumbar spine was prepped with chlorhexidine gluconate solution times one and draped in a sterile manner  The L4-5 interspace was identified and marked under AP fluoroscopy  The skin and subcutaneous tissues in the area were anesthetized with 1% lidocaine  A 20-gauge Tuohy epidural needle was directed toward the interspace under fluoroscopic guidance until the ligamentum flavum was engaged  From this point, a loss of resistance technique with air was used to identify entrance of the needle into the epidural space  Once an appropriate loss was obtained, negative aspiration was confirmed, and 1 ml Omnipaque 300 contrast solution was injected  An appropriate epidurogram was noted  Then, after negative aspiration, a solution consisting of 1-mL depo-medrol (80mg/mL) and 2-mL bupivacaine 0 25% and 3-mL preservative-free saline was easily injected  The needle was removed with a 1% lidocaine flush  The patient's back was cleaned and a bandage was placed over the site of needle insertion  Disposition: The patient tolerated the procedure well, and there were no apparent complications   The patient was taken to the recovery area where written discharge instructions for the procedure were given             Signatures   Electronically signed by : Kavitha Martino MD; Nov  3 2017  1:10PM EST                       (Author)

## 2018-03-07 NOTE — PROCEDURES
Procedure      Pre-procedure Diagnosis: Right shoulder pain  Post-procedure Diagnosis: Right shoulder pain  Operation Title(s):  Right subdeltoid/subacromial bursa injection under ultrasound guidance  Attending Surgeon:   Jolene Saavedra MD  Anesthesia:   Local    Indications: The patient is a 58year-old male with a diagnosis of right shoulder pain  The patient's history and physical exam were reviewed  The risks, benefits and alternatives to the procedure were discussed, and all questions were answered to the patient's satisfaction  The patient agreed to proceed, and written informed consent was obtained  Procedure in Detail: The patient was brought into the exam room and placed in the exam room chair  The right shoulder was prepped with chloraprep and draped in a sterile manner  A sterile ultrasound probe cover and gel was placed over a 3 75 MHz curvilinear transducer probe  The probe was placed over the right shoulder to visualize the subdeltoid/subacromial bursal region  Optimal needle path was determined  Then, under ultrasound guidance, a 2 inch ultrasound needle was advanced until the needle entered the bursa region  After visualization of the tip in the target area and negative aspiration for blood, a mixture of bupivacaine 0 25% 3 mL and Depo-Medrol 40 mg/mL 1 mL was injected into bursal region  The patient's shoulder was cleaned and a bandage was placed over the sites of needle insertion  Disposition: The patient tolerated the procedure well and there were no apparent complications  The patient was taken to the recovery area where written discharge instructions for the procedure were given          Signatures   Electronically signed by : Dale Broussard MD; Dec  8 2017  3:12PM EST                       (Author)

## 2018-07-13 ENCOUNTER — OFFICE VISIT (OUTPATIENT)
Dept: FAMILY MEDICINE CLINIC | Facility: CLINIC | Age: 63
End: 2018-07-13
Payer: MEDICARE

## 2018-07-13 VITALS
HEART RATE: 84 BPM | WEIGHT: 220.8 LBS | BODY MASS INDEX: 33.46 KG/M2 | DIASTOLIC BLOOD PRESSURE: 52 MMHG | TEMPERATURE: 97.9 F | HEIGHT: 68 IN | SYSTOLIC BLOOD PRESSURE: 112 MMHG

## 2018-07-13 DIAGNOSIS — I48.0 PAROXYSMAL ATRIAL FIBRILLATION (HCC): ICD-10-CM

## 2018-07-13 DIAGNOSIS — K31.811 GASTROINTESTINAL HEMORRHAGE ASSOCIATED WITH ANGIODYSPLASIA OF STOMACH AND DUODENUM: ICD-10-CM

## 2018-07-13 DIAGNOSIS — I10 BENIGN ESSENTIAL HYPERTENSION: ICD-10-CM

## 2018-07-13 DIAGNOSIS — D64.9 ANEMIA, UNSPECIFIED TYPE: Primary | ICD-10-CM

## 2018-07-13 DIAGNOSIS — Z72.0 TOBACCO ABUSE: ICD-10-CM

## 2018-07-13 DIAGNOSIS — N18.6 ESRD (END STAGE RENAL DISEASE) ON DIALYSIS (HCC): ICD-10-CM

## 2018-07-13 DIAGNOSIS — I50.9 CHRONIC CONGESTIVE HEART FAILURE, UNSPECIFIED HEART FAILURE TYPE (HCC): ICD-10-CM

## 2018-07-13 DIAGNOSIS — I25.5 ISCHEMIC CARDIOMYOPATHY: ICD-10-CM

## 2018-07-13 DIAGNOSIS — Z99.2 ESRD (END STAGE RENAL DISEASE) ON DIALYSIS (HCC): ICD-10-CM

## 2018-07-13 DIAGNOSIS — E55.9 VITAMIN D DEFICIENCY: ICD-10-CM

## 2018-07-13 DIAGNOSIS — J32.9 CHRONIC SINUSITIS, UNSPECIFIED LOCATION: ICD-10-CM

## 2018-07-13 DIAGNOSIS — J34.2 NASAL SEPTAL DEVIATION: ICD-10-CM

## 2018-07-13 DIAGNOSIS — I25.10 ATHEROSCLEROSIS OF NATIVE CORONARY ARTERY OF NATIVE HEART WITHOUT ANGINA PECTORIS: ICD-10-CM

## 2018-07-13 DIAGNOSIS — J30.9 ALLERGIC RHINITIS, UNSPECIFIED SEASONALITY, UNSPECIFIED TRIGGER: ICD-10-CM

## 2018-07-13 PROBLEM — M15.9 GENERALIZED OSTEOARTHRITIS OF MULTIPLE SITES: Status: ACTIVE | Noted: 2017-08-04

## 2018-07-13 PROBLEM — K92.2 GI BLEEDING: Status: ACTIVE | Noted: 2018-06-06

## 2018-07-13 LAB — SL AMB POCT HGB: 7.7

## 2018-07-13 PROCEDURE — 99214 OFFICE O/P EST MOD 30 MIN: CPT | Performed by: FAMILY MEDICINE

## 2018-07-13 PROCEDURE — 85018 HEMOGLOBIN: CPT | Performed by: FAMILY MEDICINE

## 2018-07-13 RX ORDER — CHOLECALCIFEROL (VITAMIN D3) 10 MCG
TABLET ORAL
COMMUNITY
Start: 2013-06-11 | End: 2020-02-14 | Stop reason: ALTCHOICE

## 2018-07-13 RX ORDER — ATORVASTATIN CALCIUM 40 MG/1
40 TABLET, FILM COATED ORAL
COMMUNITY
Start: 2016-06-07 | End: 2018-07-26 | Stop reason: SDUPTHER

## 2018-07-13 RX ORDER — PANTOPRAZOLE SODIUM 40 MG/1
40 TABLET, DELAYED RELEASE ORAL
COMMUNITY
Start: 2018-06-08 | End: 2020-08-31 | Stop reason: ALTCHOICE

## 2018-07-13 RX ORDER — AMLODIPINE BESYLATE 5 MG/1
5 TABLET ORAL
COMMUNITY
Start: 2015-11-11 | End: 2020-02-14 | Stop reason: ALTCHOICE

## 2018-07-13 RX ORDER — ERGOCALCIFEROL (VITAMIN D2) 1250 MCG
1 CAPSULE ORAL WEEKLY
COMMUNITY
Start: 2016-06-22

## 2018-07-13 RX ORDER — SENNOSIDES 8.6 MG
1 CAPSULE ORAL
COMMUNITY
Start: 2017-08-04

## 2018-07-13 RX ORDER — LISINOPRIL 20 MG/1
20 TABLET ORAL
COMMUNITY
End: 2020-02-14 | Stop reason: ALTCHOICE

## 2018-07-13 RX ORDER — CINACALCET 90 MG/1
90 TABLET, FILM COATED ORAL
COMMUNITY
End: 2020-02-14 | Stop reason: ALTCHOICE

## 2018-07-13 RX ORDER — FLUTICASONE PROPIONATE 50 MCG
1 SPRAY, SUSPENSION (ML) NASAL
COMMUNITY
Start: 2016-01-28 | End: 2020-08-31 | Stop reason: SDUPTHER

## 2018-07-13 RX ORDER — ASPIRIN 81 MG/1
81 TABLET, CHEWABLE ORAL
COMMUNITY
Start: 2016-06-08

## 2018-07-13 RX ORDER — CALCIUM ACETATE 667 MG/1
2001 CAPSULE ORAL
COMMUNITY

## 2018-07-13 RX ORDER — CINACALCET 60 MG/1
TABLET, FILM COATED ORAL
COMMUNITY
Start: 2013-06-11

## 2018-07-13 NOTE — ASSESSMENT & PLAN NOTE
Stable, with no acute symptoms, he seemed to be compensated, continue with no diuretic  Pt is co orthopenia , I feel pt may benefit from Diuretics , I feel pt is slightly fluid overloaded rocio with the LL swelling and worsening Orhtoepnea  To be considered after he see GI

## 2018-07-13 NOTE — PATIENT INSTRUCTIONS
Gastrointestinal Bleeding   WHAT YOU NEED TO KNOW:   Gastrointestinal (GI) bleeding may occur in any part of your digestive tract  This includes your esophagus, stomach, intestines, rectum, or anus  Bleeding may be mild to severe  Your bleeding may begin suddenly, or start slowly and last for a longer period of time  Bleeding that lasts for a longer period of time is called chronic GI bleeding  DISCHARGE INSTRUCTIONS:   Seek care immediately if:   · Your symptoms return  Contact your healthcare provider if:   · You have nausea or are vomiting  · You have heartburn  · You have questions or concerns about your condition or care  Activity:  Rest as directed  Ask when you can return to your usual activities, such as work  Slowly do more each day  Nutrition:  Ask if you need to be on a special diet  A special diet can help treat GI conditions and prevent problems such as GI bleeding  Eat small meals more often while your digestive system heals  Avoid or limit caffeine and spicy foods  Also avoid foods that cause heartburn, nausea, or diarrhea  Prevent GI bleeding:   · Manage GI conditions as directed  Examples of GI conditions include gastroesophageal reflux, peptic ulcer disease, and ulcerative colitis  Take all medicines for these conditions as directed  · Limit or do not take NSAIDs  Ask your healthcare provider if it is safe for you to take NSAIDs  NSAIDs can increase your risk for ulcers and GI bleeding  · Do not drink alcohol  Alcohol can cause ulcers and esophageal varices  Esophageal varices are swollen blood vessels in your esophagus  Over time the blood vessels become weak and may bleed  · Do not smoke  Nicotine and other chemicals in cigarettes and cigars can increase your risk for ulcers  Ask your healthcare provider for information if you currently smoke and need help to quit  E-cigarettes or smokeless tobacco still contain nicotine   Talk to your healthcare provider before you use these products  Follow up with your healthcare provider as directed: You may need to return for a colonoscopy, endoscopy, or other tests  These tests can make sure you do not have more bleeding  Write down your questions so you remember to ask them during your visits  © 2017 [address] [employer] [employer] Information is for End User's use only and may not be sold, redistributed or otherwise used for commercial purposes  All illustrations and images included in CareNotes® are the copyrighted property of [employer] , [employer]  or Franky Arciniega  The above information is an  only  It is not intended as medical advice for individual conditions or treatments  Talk to your doctor, nurse or pharmacist before following any medical regimen to see if it is safe and effective for you

## 2018-07-13 NOTE — ASSESSMENT & PLAN NOTE
Pt c/o chronic nasal congestion and increase phlegm, no evidence of acute sinus infection, pt was referred to ENT for evaluation of his Chronic sinusitis , to cont with Flonase for now

## 2018-07-13 NOTE — PROGRESS NOTES
Assessment/Plan:  Mr Primitivo Lennox is a 61years old male here for follow-up after his recent hospitalization, patient lost his son (32years old at [address]) recently  GI bleeding  Patient was admitted to the hospital in June for GI bleeding, his hemoglobin was 6 4, patient was transfused 4 units of PRBC, his last hemoglobin was 9 7 on the day of the discharge  Patient denied any further GI bleeding  Patient continued on Protonix 40 mg daily  No evidence of acute GI loss, but with a drop in his hemoglobin patient was referred to GI stat  His HG today in the office 7 7, there is a significant drop since his discharge although pt is asymptomatic,I advised patient to fu with GI and to go to ER if any acute symptoms    I advised pt to take protonix 40 mg BID  Atrial fibrillation ([address] )  Stable, has regular follow-up with cardiologist, to continue with the beta-blocker, on Coumadin  His last INR was July 11 and his INR was 1 8   Arteriosclerosis of coronary artery  Stable has regular follow-up with cardiologist, no acute symptoms  Benign essential hypertension  Blood pressure is very well controlled continue with same medication  Congestive heart failure (HCC)  Stable, with no acute symptoms, he seemed to be compensated, continue with no diuretic  Pt is co orthopenia , I feel pt may benefit from Diuretics , I feel pt is slightly fluid overloaded rocio with the LL swelling and worsening Orhtoepnea  To be considered after he see GI  Anemia  Hemoglobin today 7 7, patient is asymptomatic, patient referred to GI as soon as possible for evaluation of his anemia  Chronic sinusitis  Pt c/o chronic nasal congestion and increase phlegm, no evidence of acute sinus infection, pt was referred to ENT for evaluation of his Chronic sinusitis , to cont with Flonase for now              Diagnoses and all orders for this visit:    Anemia, unspecified type    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  -     POCT hemoglobin fingerstick  -     Ambulatory referral to Gastroenterology    Paroxysmal atrial fibrillation ([facility] )    ESRD (end stage renal disease) on dialysis ([place] [address] )    Ischemic cardiomyopathy    Benign essential hypertension    Atherosclerosis of native coronary artery of native heart without angina pectoris    Chronic congestive heart failure, unspecified heart failure type ([place] [address] )    Tobacco abuse    Vitamin D deficiency    Allergic rhinitis, unspecified seasonality, unspecified trigger  -     Ambulatory Referral to Otolaryngology    Chronic sinusitis, unspecified location  -     Ambulatory Referral to Otolaryngology    Nasal septal deviation  -     Ambulatory Referral to Otolaryngology    Other orders  -     amLODIPine (NORVASC) 5 mg tablet; Take 5 mg by mouth  -     acetaminophen (TYLENOL) 650 mg CR tablet; Take 1 tablet by mouth  -     aspirin 81 mg chewable tablet; Chew 81 mg  -     atorvastatin (LIPITOR) 40 mg tablet; Take 40 mg by mouth  -     calcium acetate (PHOSLO) CAPS capsule; Take 2,001 mg by mouth  -     cinacalcet (SENSIPAR) 90 MG tablet; Take 90 mg by mouth  -     fluticasone (FLONASE) 50 mcg/act nasal spray; 1 spray into each nostril  -     lisinopril (ZESTRIL) 20 mg tablet; Take 20 mg by mouth  -     metoprolol tartrate (LOPRESSOR) 25 mg tablet; Take 12 5 mg by mouth  -     b complex-vitamin C-folic acid (NEPHROCAPS) 1 mg capsule; Take by mouth  -     pantoprazole (PROTONIX) 40 mg tablet; Take 40 mg by mouth  -     SEA SOFT NASAL MIST 0 65 % nasal spray; instill 1 spray into each nostril if needed  -     ergocalciferol (ERGOCALCIFEROL) 37425 units capsule; Take 1 capsule by mouth once a week  -     cinacalcet (SENSIPAR) 60 MG tablet; Take by mouth          Subjective: Pt here for f/u to GI Bleed, Pt states his hglb is low at 7 6 as per pts D[facility],Pt is concerned of another bleed, Pt states he is on coumadin and is taking 12 5mg alternating with 10 mg       Patient ID: Olga King is a 61 y o  male  Patient is here for follow-up on his chronic condition include chronic kidney disease on dialysis, hypertension hyperlipidemia coronary artery disease, congestive heart failure and atrial fibrillation, 4 weeks ago patient had an episode of GI bleeding which was evaluated at the hospital, patient underwent upper endoscopy and they found duodenal AVM which was cauterized, patient hemoglobin was stabilized after 4 units of RBCs transfusion, patient was maintained on Protonix for 2 weeks and then switched Protonix once a day  Patient continued on his medication the same as before  Patient continued on dialysis  Patient had his hemoglobin checked last week at the dialysis center and was told his hemoglobin was 7 6  Patient denied any hematemesis, abdominal pain, melena, or rectal bleeding  The following portions of the patient's history were reviewed and updated as appropriate: allergies, current medications, past family history, past medical history, past social history, past surgical history and problem list     Review of Systems   Constitutional: Negative for appetite change, chills and fever  HENT: Negative for congestion, sore throat and voice change  Eyes: Negative for pain and visual disturbance  Respiratory: Negative for cough  Cardiovascular: Negative for chest pain and palpitations  Gastrointestinal: Negative for abdominal pain, constipation, diarrhea and nausea  Endocrine: Negative for cold intolerance, heat intolerance and polyuria  Genitourinary: Negative for dysuria, enuresis, flank pain and frequency  Musculoskeletal: Negative for gait problem, joint swelling and neck pain  Skin: Negative for color change and wound  Neurological: Negative for dizziness, syncope, speech difficulty, numbness and headaches  Psychiatric/Behavioral: Negative for behavioral problems and confusion  The patient is not nervous/anxious  Objective:    /52   Pulse 84   Temp 97 9 °F (36 6 °C)   Ht 5' 8" (1 727 m)   Wt 100 kg (220 lb 12 8 oz)   BMI 33 57 kg/m²        Physical Exam   Constitutional: He is oriented to person, place, and time  He appears well-developed and well-nourished  HENT:   Head: Normocephalic and atraumatic  Eyes: Conjunctivae and EOM are normal  Pupils are equal, round, and reactive to light  Neck: Normal range of motion  Neck supple  Cardiovascular: Normal rate, regular rhythm, normal heart sounds and intact distal pulses  Pulmonary/Chest: Effort normal and breath sounds normal    Abdominal: Soft  Bowel sounds are normal    Musculoskeletal: Normal range of motion  Neurological: He is alert and oriented to person, place, and time  He has normal reflexes  Skin: Skin is warm and dry  Psychiatric: He has a normal mood and affect  His behavior is normal    Nursing note and vitals reviewed

## 2018-07-13 NOTE — ASSESSMENT & PLAN NOTE
Patient was admitted to the hospital in June for GI bleeding, his hemoglobin was 6 4, patient was transfused 4 units of PRBC, his last hemoglobin was 9 7 on the day of the discharge  Patient denied any further GI bleeding  Patient continued on Protonix 40 mg daily  No evidence of acute GI loss, but with a drop in his hemoglobin patient was referred to GI stat  His HG today in the office 7 7, there is a significant drop since his discharge although pt is asymptomatic,I advised patient to fu with GI and to go to ER if any acute symptoms    I advised pt to take protonix 40 mg BID

## 2018-07-13 NOTE — ASSESSMENT & PLAN NOTE
Stable, has regular follow-up with cardiologist, to continue with the beta-blocker, on Coumadin  His last INR was July 11 and his INR was 1 8

## 2018-07-13 NOTE — ASSESSMENT & PLAN NOTE
Hemoglobin today 7 7, patient is asymptomatic, patient referred to GI as soon as possible for evaluation of his anemia

## 2018-07-26 DIAGNOSIS — E78.5 HYPERLIPIDEMIA, UNSPECIFIED HYPERLIPIDEMIA TYPE: Primary | ICD-10-CM

## 2018-07-26 RX ORDER — ATORVASTATIN CALCIUM 40 MG/1
TABLET, FILM COATED ORAL
Qty: 30 TABLET | Refills: 5 | Status: SHIPPED | OUTPATIENT
Start: 2018-07-26 | End: 2019-11-13 | Stop reason: SDUPTHER

## 2018-11-19 ENCOUNTER — LAB REQUISITION (OUTPATIENT)
Dept: LAB | Facility: HOSPITAL | Age: 63
End: 2018-11-19
Payer: MEDICARE

## 2018-11-19 DIAGNOSIS — N18.30 CHRONIC KIDNEY DISEASE, STAGE III (MODERATE) (HCC): ICD-10-CM

## 2018-11-19 LAB — POTASSIUM SERPL-SCNC: 5 MMOL/L (ref 3.5–5.3)

## 2018-11-19 PROCEDURE — 84132 ASSAY OF SERUM POTASSIUM: CPT | Performed by: INTERNAL MEDICINE

## 2019-07-06 ENCOUNTER — LAB REQUISITION (OUTPATIENT)
Dept: LAB | Facility: HOSPITAL | Age: 64
End: 2019-07-06
Payer: MEDICARE

## 2019-07-06 DIAGNOSIS — E87.8 OTHER DISORDERS OF ELECTROLYTE AND FLUID BALANCE, NOT ELSEWHERE CLASSIFIED: ICD-10-CM

## 2019-07-06 LAB — POTASSIUM SERPL-SCNC: 5.6 MMOL/L (ref 3.5–5.3)

## 2019-07-06 PROCEDURE — 84132 ASSAY OF SERUM POTASSIUM: CPT | Performed by: INTERNAL MEDICINE

## 2019-11-13 ENCOUNTER — OFFICE VISIT (OUTPATIENT)
Dept: FAMILY MEDICINE CLINIC | Facility: CLINIC | Age: 64
End: 2019-11-13
Payer: MEDICARE

## 2019-11-13 VITALS
HEIGHT: 68 IN | BODY MASS INDEX: 31.83 KG/M2 | DIASTOLIC BLOOD PRESSURE: 54 MMHG | SYSTOLIC BLOOD PRESSURE: 94 MMHG | WEIGHT: 210 LBS | HEART RATE: 64 BPM

## 2019-11-13 DIAGNOSIS — I25.5 ISCHEMIC CARDIOMYOPATHY: ICD-10-CM

## 2019-11-13 DIAGNOSIS — Z99.2 ESRD (END STAGE RENAL DISEASE) ON DIALYSIS (HCC): ICD-10-CM

## 2019-11-13 DIAGNOSIS — E78.5 HYPERLIPIDEMIA, UNSPECIFIED HYPERLIPIDEMIA TYPE: ICD-10-CM

## 2019-11-13 DIAGNOSIS — J32.9 CHRONIC SINUSITIS, UNSPECIFIED LOCATION: ICD-10-CM

## 2019-11-13 DIAGNOSIS — Z12.11 COLON CANCER SCREENING: ICD-10-CM

## 2019-11-13 DIAGNOSIS — D63.8 ANEMIA OF CHRONIC DISEASE: ICD-10-CM

## 2019-11-13 DIAGNOSIS — E66.09 CLASS 1 OBESITY DUE TO EXCESS CALORIES WITH SERIOUS COMORBIDITY AND BODY MASS INDEX (BMI) OF 31.0 TO 31.9 IN ADULT: ICD-10-CM

## 2019-11-13 DIAGNOSIS — I25.10 ARTERIOSCLEROSIS OF CORONARY ARTERY: Primary | ICD-10-CM

## 2019-11-13 DIAGNOSIS — I50.9 CHRONIC CONGESTIVE HEART FAILURE, UNSPECIFIED HEART FAILURE TYPE (HCC): ICD-10-CM

## 2019-11-13 DIAGNOSIS — I11.0 HYPERTENSIVE HEART DISEASE WITH CONGESTIVE HEART FAILURE, UNSPECIFIED HEART FAILURE TYPE (HCC): ICD-10-CM

## 2019-11-13 DIAGNOSIS — K31.811 GASTROINTESTINAL HEMORRHAGE ASSOCIATED WITH ANGIODYSPLASIA OF STOMACH AND DUODENUM: ICD-10-CM

## 2019-11-13 DIAGNOSIS — I48.0 PAROXYSMAL ATRIAL FIBRILLATION (HCC): ICD-10-CM

## 2019-11-13 DIAGNOSIS — E55.9 VITAMIN D DEFICIENCY: ICD-10-CM

## 2019-11-13 DIAGNOSIS — N18.6 ESRD (END STAGE RENAL DISEASE) ON DIALYSIS (HCC): ICD-10-CM

## 2019-11-13 DIAGNOSIS — Z72.0 TOBACCO ABUSE: ICD-10-CM

## 2019-11-13 PROBLEM — D64.9 ANEMIA: Status: RESOLVED | Noted: 2018-07-13 | Resolved: 2019-11-13

## 2019-11-13 PROCEDURE — 99215 OFFICE O/P EST HI 40 MIN: CPT | Performed by: FAMILY MEDICINE

## 2019-11-13 RX ORDER — WARFARIN SODIUM 5 MG/1
TABLET ORAL
COMMUNITY
End: 2019-11-13

## 2019-11-13 RX ORDER — WARFARIN SODIUM 5 MG/1
TABLET ORAL
Qty: 60 TABLET | Refills: 0 | Status: SHIPPED | OUTPATIENT
Start: 2019-11-13 | End: 2020-02-14 | Stop reason: ALTCHOICE

## 2019-11-13 RX ORDER — ATORVASTATIN CALCIUM 40 MG/1
40 TABLET, FILM COATED ORAL
Qty: 30 TABLET | Refills: 0 | Status: SHIPPED | OUTPATIENT
Start: 2019-11-13 | End: 2019-12-10 | Stop reason: SDUPTHER

## 2019-11-13 NOTE — ASSESSMENT & PLAN NOTE
Patient does have a history of CAD  Had stent and catheterization before  He has not seen Cardiology in a while now, they are managing his warfarin for atrial fibrillation  I would recommend that he follow with Cardiology in the near future

## 2019-11-13 NOTE — PATIENT INSTRUCTIONS
Problem List Items Addressed This Visit     Anemia of chronic disease     Patient does have anemia of chronic disease, most likely secondary to the end-stage renal disease  Would recommend check CBC and follow up after that  No evidence of labs in the chart  Relevant Orders    CBC and differential    Arteriosclerosis of coronary artery - Primary     Patient does have a history of CAD  Had stent and catheterization before  He has not seen Cardiology in a while now, they are managing his warfarin for atrial fibrillation  I would recommend that he follow with Cardiology in the near future  Relevant Medications    metoprolol tartrate (LOPRESSOR) 25 mg tablet    Other Relevant Orders    Ambulatory referral to Cardiology    CBC and differential    Comprehensive metabolic panel    TSH, 3rd generation    Atrial fibrillation [facility])     Patient does have atrial fibrillation  He has been having the warfarin managed by [facility]  I would recommend that he have that prescription renewed by them, but I will give him a 30 day supply  Beyond this, would recommend follow-up with Cardiology as well  Relevant Medications    metoprolol tartrate (LOPRESSOR) 25 mg tablet    JANTOVEN 5 MG tablet    Other Relevant Orders    Ambulatory referral to Cardiology    CBC and differential    Comprehensive metabolic panel    Chronic sinusitis     Patient does have chronic sinusitis by history  Unfortunately, he has been recently using Afrin nasal spray  He should discontinue using Afrin nasal spray and use only Flonase or other inhaled nasal steroids such as Nasacort, Nasonex, Rhinocort  All of these are available over-the-counter  Afrin can cause significant damage to the tissue infused over a long period of time  On further discussion, he has been using Afrin, but only 1 or 2 sprays for approximately 3 days in a row, then he will not use it for awhile again    I would still recommend Flonase or other inhaled steroid as above  Congestive heart failure (HCC)     Wt Readings from Last 3 Encounters:   11/13/19 95 3 kg (210 lb)   07/13/18 100 kg (220 lb 12 8 oz)   10/16/17 103 kg (227 lb)       Patient does seem to be having worse problems with heart failure  He has problems going from Saturday to Tuesday with dialysis  Dialysis will remove fluid weight from him, but I am concerned about what this means as far as the heart failure aspect  He does have classic symptoms that he is becoming hypervolemic, therefore I would recommend he follow with Heart doctors in the future  Relevant Medications    metoprolol tartrate (LOPRESSOR) 25 mg tablet    Other Relevant Orders    Ambulatory referral to Cardiology    ESRD (end stage renal disease) on dialysis [facility])     Patient does have end-stage renal disease  Currently on dialysis  Follows at [facility]  GI bleeding     Following with Dr Nemo Juarez  He reports that he just say Dr Nemo Juarez  HLD (hyperlipidemia)     Patient does have a history of hyperlipidemia  He is currently on atorvastatin, but no recent check of labs  Will order same  He should also renew the atorvastatin at this time, and will follow afterwards  Relevant Medications    atorvastatin (LIPITOR) 40 mg tablet    Other Relevant Orders    Comprehensive metabolic panel    Lipid Panel with Direct LDL reflex    Hypertensive heart disease with congestive heart failure (HCC)     Wt Readings from Last 3 Encounters:   11/13/19 95 3 kg (210 lb)   07/13/18 100 kg (220 lb 12 8 oz)   10/16/17 103 kg (227 lb)     Blood pressure today is slightly low  I am concerned about the medication that he is taking, including metoprolol  The metoprolol should cover for only 12 hours, but it is somewhat different with his dialysis  Would recommend follow with Cardiology, as well as Nephrology for this               Relevant Medications    metoprolol tartrate (LOPRESSOR) 25 mg tablet    Other Relevant Orders Ambulatory referral to Cardiology    CBC and differential    Comprehensive metabolic panel    TSH, 3rd generation    Ischemic cardiomyopathy     This diagnosis is noted in the chart, but I would again defer to Cardiology for further evaluation  Relevant Medications    metoprolol tartrate (LOPRESSOR) 25 mg tablet    Other Relevant Orders    Ambulatory referral to Cardiology    Obesity     Patient's weight is elevated  At this point, would not make any adjustments given his dialysis status  Follow-up in the future  Tobacco abuse     Tobacco Cessation Counseling: Tobacco cessation counseling and education was provided  The patient is sincerely urged to quit consumption of tobacco  He is not ready to quit tobacco  The numerous health risks of tobacco consumption were discussed  If he decides to quit, there are a number of helpful adjunctive aids, and he can see me to discuss nicotine replacement therapy, chantix, or bupropion anytime in the future  Patient smokes about half pack per day  He is not interested in quitting at this point, though he does understand the increased risk that this generates  Vitamin D deficiency     Vitamin-D level as been low in the past, he is getting vitamin-D  Recheck labs           Relevant Orders    Vitamin D 25 hydroxy      Other Visit Diagnoses     Colon cancer screening        Relevant Orders    Occult Blood, Fecal Immunochemical

## 2019-11-13 NOTE — ASSESSMENT & PLAN NOTE
Patient does have a history of hyperlipidemia  He is currently on atorvastatin, but no recent check of labs  Will order same  He should also renew the atorvastatin at this time, and will follow afterwards

## 2019-11-13 NOTE — ASSESSMENT & PLAN NOTE
Patient's weight is elevated  At this point, would not make any adjustments given his dialysis status  Follow-up in the future

## 2019-11-13 NOTE — ASSESSMENT & PLAN NOTE
Wt Readings from Last 3 Encounters:   11/13/19 95 3 kg (210 lb)   07/13/18 100 kg (220 lb 12 8 oz)   10/16/17 103 kg (227 lb)       Patient does seem to be having worse problems with heart failure  He has problems going from Saturday to Tuesday with dialysis  Dialysis will remove fluid weight from him, but I am concerned about what this means as far as the heart failure aspect  He does have classic symptoms that he is becoming hypervolemic, therefore I would recommend he follow with Heart doctors in the future

## 2019-11-13 NOTE — PROGRESS NOTES
Assessment and Plan:    Problem List Items Addressed This Visit     Anemia of chronic disease     Patient does have anemia of chronic disease, most likely secondary to the end-stage renal disease  Would recommend check CBC and follow up after that  No evidence of labs in the chart  Relevant Orders    CBC and differential    Arteriosclerosis of coronary artery - Primary     Patient does have a history of CAD  Had stent and catheterization before  He has not seen Cardiology in a while now, they are managing his warfarin for atrial fibrillation  I would recommend that he follow with Cardiology in the near future  Relevant Medications    metoprolol tartrate (LOPRESSOR) 25 mg tablet    Other Relevant Orders    Ambulatory referral to Cardiology    CBC and differential    Comprehensive metabolic panel    TSH, 3rd generation    Atrial fibrillation [facility])     Patient does have atrial fibrillation  He has been having the warfarin managed by [facility]  I would recommend that he have that prescription renewed by them, but I will give him a 30 day supply  Beyond this, would recommend follow-up with Cardiology as well  Relevant Medications    metoprolol tartrate (LOPRESSOR) 25 mg tablet    JANTOVEN 5 MG tablet    Other Relevant Orders    Ambulatory referral to Cardiology    CBC and differential    Comprehensive metabolic panel    Chronic sinusitis     Patient does have chronic sinusitis by history  Unfortunately, he has been recently using Afrin nasal spray  He should discontinue using Afrin nasal spray and use only Flonase or other inhaled nasal steroids such as Nasacort, Nasonex, Rhinocort  All of these are available over-the-counter  Afrin can cause significant damage to the tissue infused over a long period of time  On further discussion, he has been using Afrin, but only 1 or 2 sprays for approximately 3 days in a row, then he will not use it for awhile again    I would still recommend Flonase or other inhaled steroid as above  Congestive heart failure (HCC)     Wt Readings from Last 3 Encounters:   11/13/19 95 3 kg (210 lb)   07/13/18 100 kg (220 lb 12 8 oz)   10/16/17 103 kg (227 lb)       Patient does seem to be having worse problems with heart failure  He has problems going from Saturday to Tuesday with dialysis  Dialysis will remove fluid weight from him, but I am concerned about what this means as far as the heart failure aspect  He does have classic symptoms that he is becoming hypervolemic, therefore I would recommend he follow with Heart doctors in the future  Relevant Medications    metoprolol tartrate (LOPRESSOR) 25 mg tablet    Other Relevant Orders    Ambulatory referral to Cardiology    ESRD (end stage renal disease) on dialysis [facility])     Patient does have end-stage renal disease  Currently on dialysis  Follows at [facility]  GI bleeding     Following with Dr Manuel Veliz  He reports that he just say Dr Manuel Veliz  HLD (hyperlipidemia)     Patient does have a history of hyperlipidemia  He is currently on atorvastatin, but no recent check of labs  Will order same  He should also renew the atorvastatin at this time, and will follow afterwards  Relevant Medications    atorvastatin (LIPITOR) 40 mg tablet    Other Relevant Orders    Comprehensive metabolic panel    Lipid Panel with Direct LDL reflex    Hypertensive heart disease with congestive heart failure (HCC)     Wt Readings from Last 3 Encounters:   11/13/19 95 3 kg (210 lb)   07/13/18 100 kg (220 lb 12 8 oz)   10/16/17 103 kg (227 lb)     Blood pressure today is slightly low  I am concerned about the medication that he is taking, including metoprolol  The metoprolol should cover for only 12 hours, but it is somewhat different with his dialysis  Would recommend follow with Cardiology, as well as Nephrology for this               Relevant Medications    metoprolol tartrate (LOPRESSOR) 25 mg tablet Other Relevant Orders    Ambulatory referral to Cardiology    CBC and differential    Comprehensive metabolic panel    TSH, 3rd generation    Ischemic cardiomyopathy     This diagnosis is noted in the chart, but I would again defer to Cardiology for further evaluation  Relevant Medications    metoprolol tartrate (LOPRESSOR) 25 mg tablet    Other Relevant Orders    Ambulatory referral to Cardiology    Obesity     Patient's weight is elevated  At this point, would not make any adjustments given his dialysis status  Follow-up in the future  Tobacco abuse     Tobacco Cessation Counseling: Tobacco cessation counseling and education was provided  The patient is sincerely urged to quit consumption of tobacco  He is not ready to quit tobacco  The numerous health risks of tobacco consumption were discussed  If he decides to quit, there are a number of helpful adjunctive aids, and he can see me to discuss nicotine replacement therapy, chantix, or bupropion anytime in the future  Patient smokes about half pack per day  He is not interested in quitting at this point, though he does understand the increased risk that this generates  Vitamin D deficiency     Vitamin-D level as been low in the past, he is getting vitamin-D  Recheck labs  Relevant Orders    Vitamin D 25 hydroxy      Other Visit Diagnoses     Colon cancer screening        Relevant Orders    Occult Blood, Fecal Immunochemical        Health maintenance topics were deferred today for pneumonia, PHQ-9, hepatitis vaccines as we are unsure what exactly is happened at [address] yet  Patient will follow up in the future  This is also his 1st visit in a year and a half  Diagnoses and all orders for this visit:    Arteriosclerosis of coronary artery  -     Ambulatory referral to Cardiology; Future  -     CBC and differential; Future  -     Comprehensive metabolic panel; Future  -     TSH, 3rd generation;  Future    Hyperlipidemia, unspecified hyperlipidemia type  -     atorvastatin (LIPITOR) 40 mg tablet; Take 1 tablet (40 mg total) by mouth daily at bedtime  -     Comprehensive metabolic panel; Future  -     Lipid Panel with Direct LDL reflex; Future    Paroxysmal atrial fibrillation ([facility] [address] )  -     Ambulatory referral to Cardiology; Future  -     JANTOVEN 5 MG tablet; 10 mg daily, except Sat and Tues: 7 5mg PO  -     CBC and differential; Future  -     Comprehensive metabolic panel; Future    Chronic congestive heart failure, unspecified heart failure type [facility])  -     Ambulatory referral to Cardiology; Future    Hypertensive heart disease with congestive heart failure, unspecified heart failure type (HCC)  -     metoprolol tartrate (LOPRESSOR) 25 mg tablet; Take 0 5 tablets (12 5 mg total) by mouth every 12 (twelve) hours  -     Ambulatory referral to Cardiology; Future  -     CBC and differential; Future  -     Comprehensive metabolic panel; Future  -     TSH, 3rd generation; Future    Anemia of chronic disease  -     CBC and differential; Future    ESRD (end stage renal disease) on dialysis ([facility])    Tobacco abuse    Vitamin D deficiency  -     Vitamin D 25 hydroxy; Future    Chronic sinusitis, unspecified location    Ischemic cardiomyopathy  -     Ambulatory referral to Cardiology; Future    Class 1 obesity due to excess calories with serious comorbidity and body mass index (BMI) of 31 0 to 31 9 in adult    Colon cancer screening  -     Occult Blood, Fecal Immunochemical; Future    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    Other orders  -     Discontinue: warfarin (COUMADIN) 5 mg tablet; Take by mouth daily              Subjective:      Patient ID: Kayla Vargas is a 59 y o  male  CC:    Chief Complaint   Patient presents with    Follow-up     Follow up to chronic conditions and get meds refilled   Medication Refill   Patient is on dialysis 3x/week    [facility]    HPI:    Patient is here to follow-up for multiple issues  Patient is here to renew several medications including metoprolol, atorvastatin, Jantoven  He did have pantoprazole that was renewed recently  Patient has been going to Nephrology for dialysis  He has this 3 times a week  He has not been getting routine blood work for us, however  Today, the patient did not take any medications  He takes the amlodipine at HS, and did take it last night  Lipitor 40 mg daily  He did not take that today  Nephrocaps:  Took last night  He did take PhosLo this morning  He did take it with breakfast this morning  He does get the Sensipar injection with dialysis  The he also reports that they give him vitamin D during the treatment  Patient has been using Afrin in the nose, he has not been using Flonase  Patient takes 20 mg of lisinopril at HS  Metoprolol tartrate, 25 mg  He normally will take that at night time as well  Protonix, 40 mg at HS  Cleatis Limber:  He does take that at night as well  Reviewed his meds: Was not listed as on Cleatis Limber  He does take Cleatis Limber  Last INR was about 10 days ago: 2 7  He is having this managed with [facility]  He did have Cath and stent  He has A-Fib  On Coumadin for this  Cholesterol: No recent check of labs  He is on Lipitor  CHF: Listed in the chart, but he feels he is fine  He states that after the Cath/stents, he has no problem  He did mention that he has dialysis Tues/Thurs/Sat, and that by Monday night he is SOB, and has orthopnea and PND  Hypertension: On Metoprolol Tartrate, Norvasc, and Lisinopril  Chronic sinusitis: He is using Afrin daily  He was on Flonase before  He has had some issues with hearing before  It seems to be worse with more noises in the foom            The following portions of the patient's history were reviewed and updated as appropriate: allergies, current medications, past family history, past medical history, past social history, past surgical history and problem list       Review of Systems   Constitutional: Positive for activity change and fatigue  HENT: Positive for congestion, dental problem, postnasal drip, rhinorrhea, sinus pressure and sinus pain  Eyes: Negative  Respiratory: Positive for shortness of breath  Cardiovascular: Negative for chest pain, palpitations and leg swelling  SOB   Gastrointestinal: Negative  Endocrine: Negative  Genitourinary: Negative  Musculoskeletal: Negative  Skin: Negative  Allergic/Immunologic: Negative  Neurological: Positive for numbness (numb in fingers bilaterally  Sometimes at night )  Hematological: Negative  Psychiatric/Behavioral: Negative  All other systems reviewed and are negative  Data to review:       Objective:    Vitals:    11/13/19 1510   BP: 94/54   Pulse: 64   Weight: 95 3 kg (210 lb)   Height: 5' 8" (1 727 m)        Physical Exam   Constitutional: He appears well-developed and well-nourished  HENT:   Head: Normocephalic and atraumatic  Neck: Normal range of motion  Neck supple  Cardiovascular: Normal rate  An irregularly irregular rhythm present  Exam reveals no gallop and no friction rub  Murmur heard  Systolic murmur is present with a grade of 1/6  Pulses:       Carotid pulses are 2+ on the right side, and 2+ on the left side  Pulmonary/Chest: Effort normal and breath sounds normal  No respiratory distress  He has no wheezes  He has no rales  Nursing note and vitals reviewed  BMI Counseling: Body mass index is 31 93 kg/m²  The BMI is above normal  No BMI follow-up plan is appropriate  Patient is in an urgent or emergent medical situation

## 2019-11-13 NOTE — ASSESSMENT & PLAN NOTE
Patient does have chronic sinusitis by history  Unfortunately, he has been recently using Afrin nasal spray  He should discontinue using Afrin nasal spray and use only Flonase or other inhaled nasal steroids such as Nasacort, Nasonex, Rhinocort  All of these are available over-the-counter  Afrin can cause significant damage to the tissue infused over a long period of time  On further discussion, he has been using Afrin, but only 1 or 2 sprays for approximately 3 days in a row, then he will not use it for awhile again  I would still recommend Flonase or other inhaled steroid as above

## 2019-11-13 NOTE — ASSESSMENT & PLAN NOTE
Tobacco Cessation Counseling: Tobacco cessation counseling and education was provided  The patient is sincerely urged to quit consumption of tobacco  He is not ready to quit tobacco  The numerous health risks of tobacco consumption were discussed  If he decides to quit, there are a number of helpful adjunctive aids, and he can see me to discuss nicotine replacement therapy, chantix, or bupropion anytime in the future  Patient smokes about half pack per day  He is not interested in quitting at this point, though he does understand the increased risk that this generates

## 2019-11-13 NOTE — ASSESSMENT & PLAN NOTE
Wt Readings from Last 3 Encounters:   11/13/19 95 3 kg (210 lb)   07/13/18 100 kg (220 lb 12 8 oz)   10/16/17 103 kg (227 lb)     Blood pressure today is slightly low  I am concerned about the medication that he is taking, including metoprolol  The metoprolol should cover for only 12 hours, but it is somewhat different with his dialysis  Would recommend follow with Cardiology, as well as Nephrology for this

## 2019-11-13 NOTE — ASSESSMENT & PLAN NOTE
Patient does have anemia of chronic disease, most likely secondary to the end-stage renal disease  Would recommend check CBC and follow up after that  No evidence of labs in the chart

## 2019-11-13 NOTE — ASSESSMENT & PLAN NOTE
Patient does have atrial fibrillation  He has been having the warfarin managed by [facility]  I would recommend that he have that prescription renewed by them, but I will give him a 30 day supply  Beyond this, would recommend follow-up with Cardiology as well

## 2019-11-20 ENCOUNTER — TELEPHONE (OUTPATIENT)
Dept: OTHER | Facility: OTHER | Age: 64
End: 2019-11-20

## 2019-11-20 ENCOUNTER — TELEPHONE (OUTPATIENT)
Dept: FAMILY MEDICINE CLINIC | Facility: CLINIC | Age: 64
End: 2019-11-20

## 2019-11-21 NOTE — TELEPHONE ENCOUNTER
Phone call after hours from [facility] regarding a critical value of creatinine of 10  Reviewed patient's chart and noted he has ESRD on dialysis and that this level is stable and consistent with prior values  Any further intervention deferred to Dr Memo Lopez at this time

## 2019-11-21 NOTE — TELEPHONE ENCOUNTER
Sent this Message to Dr Joey Alcaraz via [facility] @  2030    [phone]/ Romulo Carranza from [employer]/ Pt   Ja SALMON [id number] Critical results    Told nena to call in 20-30 minutes if no call back from Dr Joey Alcaraz

## 2019-12-10 DIAGNOSIS — I11.0 HYPERTENSIVE HEART DISEASE WITH CONGESTIVE HEART FAILURE, UNSPECIFIED HEART FAILURE TYPE (HCC): ICD-10-CM

## 2019-12-10 DIAGNOSIS — E78.5 HYPERLIPIDEMIA, UNSPECIFIED HYPERLIPIDEMIA TYPE: ICD-10-CM

## 2019-12-10 RX ORDER — ATORVASTATIN CALCIUM 40 MG/1
40 TABLET, FILM COATED ORAL
Qty: 30 TABLET | Refills: 0 | Status: SHIPPED | OUTPATIENT
Start: 2019-12-10 | End: 2020-01-06

## 2020-01-06 DIAGNOSIS — I11.0 HYPERTENSIVE HEART DISEASE WITH CONGESTIVE HEART FAILURE, UNSPECIFIED HEART FAILURE TYPE (HCC): ICD-10-CM

## 2020-01-06 DIAGNOSIS — E78.5 HYPERLIPIDEMIA, UNSPECIFIED HYPERLIPIDEMIA TYPE: ICD-10-CM

## 2020-01-06 RX ORDER — ATORVASTATIN CALCIUM 40 MG/1
40 TABLET, FILM COATED ORAL
Qty: 30 TABLET | Refills: 0 | Status: SHIPPED | OUTPATIENT
Start: 2020-01-06 | End: 2020-02-04

## 2020-01-09 RX ORDER — CALCITRIOL 0.25 UG/1
CAPSULE, LIQUID FILLED ORAL
Qty: 60 CAPSULE | Refills: 0 | OUTPATIENT
Start: 2020-01-09

## 2020-01-10 RX ORDER — CALCITRIOL 0.25 UG/1
CAPSULE, LIQUID FILLED ORAL
Qty: 60 CAPSULE | Refills: 0 | OUTPATIENT
Start: 2020-01-10

## 2020-02-01 ENCOUNTER — LAB REQUISITION (OUTPATIENT)
Dept: LAB | Facility: HOSPITAL | Age: 65
End: 2020-02-01
Payer: MEDICARE

## 2020-02-01 DIAGNOSIS — E87.8 OTHER DISORDERS OF ELECTROLYTE AND FLUID BALANCE, NOT ELSEWHERE CLASSIFIED: ICD-10-CM

## 2020-02-01 LAB — POTASSIUM SERPL-SCNC: 4.5 MMOL/L (ref 3.5–5.3)

## 2020-02-01 PROCEDURE — 84132 ASSAY OF SERUM POTASSIUM: CPT | Performed by: INTERNAL MEDICINE

## 2020-02-02 DIAGNOSIS — E78.5 HYPERLIPIDEMIA, UNSPECIFIED HYPERLIPIDEMIA TYPE: ICD-10-CM

## 2020-02-04 RX ORDER — ATORVASTATIN CALCIUM 40 MG/1
40 TABLET, FILM COATED ORAL
Qty: 30 TABLET | Refills: 2 | Status: SHIPPED | OUTPATIENT
Start: 2020-02-04 | End: 2020-04-24

## 2020-02-04 NOTE — TELEPHONE ENCOUNTER
Will renew prescription, but at some point he should have repeat blood work and follow up at office visit

## 2020-02-14 ENCOUNTER — OFFICE VISIT (OUTPATIENT)
Dept: FAMILY MEDICINE CLINIC | Facility: CLINIC | Age: 65
End: 2020-02-14
Payer: MEDICARE

## 2020-02-14 VITALS
HEART RATE: 100 BPM | WEIGHT: 219 LBS | DIASTOLIC BLOOD PRESSURE: 62 MMHG | SYSTOLIC BLOOD PRESSURE: 100 MMHG | HEIGHT: 68 IN | BODY MASS INDEX: 33.19 KG/M2

## 2020-02-14 DIAGNOSIS — L08.9 INFECTED DECUBITUS ULCER, UNSPECIFIED ULCER STAGE: Primary | ICD-10-CM

## 2020-02-14 DIAGNOSIS — L89.90 INFECTED DECUBITUS ULCER, UNSPECIFIED ULCER STAGE: Primary | ICD-10-CM

## 2020-02-14 PROBLEM — M47.27 LUMBOSACRAL SPONDYLOSIS WITH RADICULOPATHY: Status: ACTIVE | Noted: 2017-10-16

## 2020-02-14 PROBLEM — J96.01 ACUTE RESPIRATORY FAILURE WITH HYPOXIA (HCC): Status: ACTIVE | Noted: 2020-01-13

## 2020-02-14 PROBLEM — I50.32 CHRONIC DIASTOLIC (CONGESTIVE) HEART FAILURE (HCC): Status: ACTIVE | Noted: 2019-12-10

## 2020-02-14 PROBLEM — I95.9 LOW BLOOD PRESSURE: Status: ACTIVE | Noted: 2019-12-09

## 2020-02-14 PROBLEM — L89.95 PRESSURE INJURY, UNSTAGEABLE, WITH INFECTION (HCC): Status: ACTIVE | Noted: 2020-02-14

## 2020-02-14 PROBLEM — M25.561 ACUTE PAIN OF RIGHT KNEE: Status: ACTIVE | Noted: 2019-12-08

## 2020-02-14 PROBLEM — S22.080A T12 COMPRESSION FRACTURE (HCC): Status: ACTIVE | Noted: 2019-08-20

## 2020-02-14 PROBLEM — I27.20 PULMONARY HYPERTENSION (HCC): Status: ACTIVE | Noted: 2019-12-10

## 2020-02-14 PROBLEM — H53.2 MONOCULAR DIPLOPIA, RIGHT EYE: Status: ACTIVE | Noted: 2017-01-11

## 2020-02-14 PROBLEM — E87.70 VOLUME OVERLOAD: Status: ACTIVE | Noted: 2020-01-13

## 2020-02-14 PROBLEM — Z95.5 STENTED CORONARY ARTERY: Status: ACTIVE | Noted: 2017-01-04

## 2020-02-14 PROBLEM — I50.43 ACUTE ON CHRONIC COMBINED SYSTOLIC AND DIASTOLIC CONGESTIVE HEART FAILURE (HCC): Status: ACTIVE | Noted: 2020-01-12

## 2020-02-14 PROCEDURE — 99213 OFFICE O/P EST LOW 20 MIN: CPT | Performed by: FAMILY MEDICINE

## 2020-02-14 PROCEDURE — 1036F TOBACCO NON-USER: CPT | Performed by: FAMILY MEDICINE

## 2020-02-14 PROCEDURE — 3008F BODY MASS INDEX DOCD: CPT | Performed by: FAMILY MEDICINE

## 2020-02-14 RX ORDER — HYDROXYZINE HYDROCHLORIDE 25 MG/1
25 TABLET, FILM COATED ORAL 3 TIMES DAILY PRN
COMMUNITY
Start: 2020-01-23

## 2020-02-14 RX ORDER — AMIODARONE HYDROCHLORIDE 200 MG/1
TABLET ORAL
COMMUNITY
Start: 2020-02-07

## 2020-02-14 RX ORDER — CALCITRIOL 0.25 UG/1
1.25 CAPSULE, LIQUID FILLED ORAL
COMMUNITY
Start: 2019-12-14 | End: 2020-12-13

## 2020-02-14 RX ORDER — APIXABAN 5 MG/1
5 TABLET, FILM COATED ORAL 2 TIMES DAILY
COMMUNITY
Start: 2020-01-24 | End: 2020-03-06 | Stop reason: SDUPTHER

## 2020-02-14 RX ORDER — LIDOCAINE 4 G/G
1 PATCH TOPICAL DAILY
COMMUNITY
Start: 2019-12-14 | End: 2020-08-31 | Stop reason: ALTCHOICE

## 2020-02-14 RX ORDER — SODIUM POLYSTYRENE SULFONATE 4.1 MEQ/G
POWDER, FOR SUSPENSION ORAL; RECTAL
COMMUNITY
Start: 2020-01-20

## 2020-02-14 RX ORDER — B,C/FERROUS FUM/FA/D3/ZINC OX 8MG-800MCG
1 TABLET ORAL DAILY
Refills: 3 | COMMUNITY
Start: 2019-12-03

## 2020-02-14 RX ORDER — MIDODRINE HYDROCHLORIDE 5 MG/1
TABLET ORAL
COMMUNITY
Start: 2020-01-24

## 2020-02-14 NOTE — ASSESSMENT & PLAN NOTE
Patient to get an appointment with wound care as soon as possible, today if arrangements can be made

## 2020-02-14 NOTE — PROGRESS NOTES
Assessment and Plan:  Follow-up with wound care stat  Problem List Items Addressed This Visit        Musculoskeletal and Integument    Pressure injury of skin with infection - Primary     Patient to get an appointment with wound care as soon as possible, today if arrangements can be made  Relevant Medications    Lidocaine 4 % PTCH    Other Relevant Orders    Ambulatory referral to Wound Care                 Diagnoses and all orders for this visit:    Infected decubitus ulcer, unspecified ulcer stage  -     Ambulatory referral to Wound Care; Future    Other orders  -     sodium polystyrene (KAYEXALATE) powder  -     Multiple Vitamins-Minerals (PRORENAL + D) TABS; Take 1 tablet by mouth daily  -     midodrine (PROAMATINE) 5 mg tablet  -     Lidocaine 4 % PTCH; Place 1 patch on the skin daily  -     hydrOXYzine HCL (ATARAX) 25 mg tablet; Take 25 mg by mouth Three times daily as needed  -     calcitriol (ROCALTROL) 0 25 mcg capsule; Take 1 25 mcg by mouth  -     amiodarone 200 mg tablet  -     ELIQUIS 5 MG              Subjective:      Patient ID: Chuck Latham is a 72 y o  male  CC:    Chief Complaint   Patient presents with    Cellulitis     RLE cellulitis  Pt states it is very painful  He has been on dialysis 3 times weekly  He is currently on vancomycin and was told to follow up with PCP to initiate wound care treatment  -  [facility]       HPI:    This is a 70-year-old male who was in the hospital for an infected decubitus ulcer on the anterior aspect of his right lower leg  He is currently on IV vancomycin which he receives when he goes for dialysis 4 times a week now instead of 3 times a week  The leg is painful and he uses a cane to ambulate  We will try to get him in to wound care as per hospital discharge as soon as possible  His blood pressure is 100/62 and his weight is 219 lb up 9 lb from the previous visit and his BMI is 33 3        The following portions of the patient's history were reviewed and updated as appropriate: allergies, current medications, past family history, past medical history, past social history, past surgical history and problem list       Review of Systems   Constitutional: Negative  HENT: Negative  Eyes: Negative  Respiratory: Negative  Cardiovascular: Negative  Gastrointestinal: Negative  Endocrine: Negative  Genitourinary: Negative  Musculoskeletal: Negative  Skin: Negative  Painful right lower leg  Allergic/Immunologic: Negative  Neurological: Negative  Hematological: Negative  Psychiatric/Behavioral: Negative  Data to review:       Objective:    Vitals:    02/14/20 0912   BP: 100/62   BP Location: Left arm   Patient Position: Sitting   Cuff Size: Large   Pulse: 100   Weight: 99 3 kg (219 lb)   Height: 5' 8" (1 727 m)        Physical Exam   Constitutional: He is oriented to person, place, and time  He appears well-developed and well-nourished  HENT:   Head: Normocephalic  Right Ear: External ear normal    Left Ear: External ear normal    Mouth/Throat: Oropharynx is clear and moist    Eyes: Pupils are equal, round, and reactive to light  Conjunctivae and EOM are normal    Neck: Normal range of motion  Neck supple  Cardiovascular: Normal rate, regular rhythm and normal heart sounds  Pulmonary/Chest: Effort normal and breath sounds normal    Abdominal: Soft  Bowel sounds are normal    Musculoskeletal: Normal range of motion  Neurological: He is alert and oriented to person, place, and time  Skin: Skin is warm  Erythematous, decubitus ulcer anterior aspect of right lower leg  Psychiatric: He has a normal mood and affect  His behavior is normal  Judgment and thought content normal    Nursing note and vitals reviewed  BMI Counseling: Body mass index is 33 3 kg/m²   The BMI is above normal  Nutrition recommendations include decreasing portion sizes, encouraging healthy choices of fruits and vegetables, decreasing fast food intake, consuming healthier snacks, limiting drinks that contain sugar, moderation in carbohydrate intake, increasing intake of lean protein, reducing intake of saturated and trans fat and reducing intake of cholesterol  Exercise recommendations include exercising 3-5 times per week  No pharmacotherapy was ordered

## 2020-02-21 RX ORDER — MIDODRINE HYDROCHLORIDE 5 MG/1
TABLET ORAL
Qty: 90 TABLET | Refills: 2 | OUTPATIENT
Start: 2020-02-21

## 2020-02-21 RX ORDER — APIXABAN 5 MG/1
TABLET, FILM COATED ORAL
Qty: 60 TABLET | Refills: 2 | OUTPATIENT
Start: 2020-02-21

## 2020-02-21 NOTE — TELEPHONE ENCOUNTER
Patient has not been in for a follow-up visit for any of his medical problems  These medications should be obtained from Cardiology, which she had seen recently

## 2020-02-27 RX ORDER — MIDODRINE HYDROCHLORIDE 5 MG/1
TABLET ORAL
Qty: 90 TABLET | Refills: 0 | OUTPATIENT
Start: 2020-02-27

## 2020-02-27 RX ORDER — APIXABAN 5 MG/1
TABLET, FILM COATED ORAL
Qty: 60 TABLET | Refills: 0 | OUTPATIENT
Start: 2020-02-27

## 2020-02-27 NOTE — TELEPHONE ENCOUNTER
Patient was just discharged from hospital   He does need to have a T cm visit set up, as well as office visit for this  With regard to his medications, he should have had the set up prior to discharge  The hospital should have provided him with 30 days a prescription  The midodrine: I do not feel comfortable filling this  Should come from Nephrology or Cardiology  Patient should have more Eliquis available

## 2020-03-06 ENCOUNTER — OFFICE VISIT (OUTPATIENT)
Dept: FAMILY MEDICINE CLINIC | Facility: CLINIC | Age: 65
End: 2020-03-06
Payer: MEDICARE

## 2020-03-06 VITALS
WEIGHT: 204.4 LBS | BODY MASS INDEX: 30.98 KG/M2 | DIASTOLIC BLOOD PRESSURE: 58 MMHG | HEART RATE: 100 BPM | HEIGHT: 68 IN | SYSTOLIC BLOOD PRESSURE: 100 MMHG

## 2020-03-06 DIAGNOSIS — N18.6 ESRD (END STAGE RENAL DISEASE) ON DIALYSIS (HCC): ICD-10-CM

## 2020-03-06 DIAGNOSIS — I50.32 CHRONIC DIASTOLIC CONGESTIVE HEART FAILURE (HCC): ICD-10-CM

## 2020-03-06 DIAGNOSIS — E55.9 VITAMIN D DEFICIENCY: ICD-10-CM

## 2020-03-06 DIAGNOSIS — I11.0 HYPERTENSIVE HEART DISEASE WITH CONGESTIVE HEART FAILURE, UNSPECIFIED HEART FAILURE TYPE (HCC): ICD-10-CM

## 2020-03-06 DIAGNOSIS — Z99.2 ESRD (END STAGE RENAL DISEASE) ON DIALYSIS (HCC): ICD-10-CM

## 2020-03-06 DIAGNOSIS — L97.911 ULCER OF RIGHT LOWER EXTREMITY, LIMITED TO BREAKDOWN OF SKIN (HCC): ICD-10-CM

## 2020-03-06 DIAGNOSIS — M47.27 LUMBOSACRAL SPONDYLOSIS WITH RADICULOPATHY: ICD-10-CM

## 2020-03-06 DIAGNOSIS — I48.0 PAROXYSMAL ATRIAL FIBRILLATION (HCC): Primary | ICD-10-CM

## 2020-03-06 DIAGNOSIS — R53.81 PHYSICAL DECONDITIONING: ICD-10-CM

## 2020-03-06 PROBLEM — J96.01 ACUTE RESPIRATORY FAILURE WITH HYPOXIA (HCC): Status: RESOLVED | Noted: 2020-01-13 | Resolved: 2020-03-06

## 2020-03-06 PROBLEM — E87.70 VOLUME OVERLOAD: Status: RESOLVED | Noted: 2020-01-13 | Resolved: 2020-03-06

## 2020-03-06 PROBLEM — I50.43 ACUTE ON CHRONIC COMBINED SYSTOLIC AND DIASTOLIC CONGESTIVE HEART FAILURE (HCC): Status: RESOLVED | Noted: 2020-01-12 | Resolved: 2020-03-06

## 2020-03-06 PROCEDURE — 1036F TOBACCO NON-USER: CPT | Performed by: FAMILY MEDICINE

## 2020-03-06 PROCEDURE — G0439 PPPS, SUBSEQ VISIT: HCPCS | Performed by: FAMILY MEDICINE

## 2020-03-06 PROCEDURE — 3008F BODY MASS INDEX DOCD: CPT | Performed by: FAMILY MEDICINE

## 2020-03-06 PROCEDURE — 99215 OFFICE O/P EST HI 40 MIN: CPT | Performed by: FAMILY MEDICINE

## 2020-03-06 RX ORDER — OXYCODONE HYDROCHLORIDE 5 MG/1
5 TABLET ORAL EVERY 8 HOURS
Qty: 20 TABLET | Refills: 0 | Status: SHIPPED | OUTPATIENT
Start: 2020-03-06 | End: 2020-03-27 | Stop reason: SDUPTHER

## 2020-03-06 RX ORDER — METOPROLOL SUCCINATE 25 MG/1
25 TABLET, EXTENDED RELEASE ORAL DAILY
Qty: 30 TABLET | Refills: 2 | Status: SHIPPED | OUTPATIENT
Start: 2020-03-06 | End: 2020-09-04

## 2020-03-06 RX ORDER — APIXABAN 5 MG/1
5 TABLET, FILM COATED ORAL 2 TIMES DAILY
Qty: 180 TABLET | Refills: 1 | Status: SHIPPED | OUTPATIENT
Start: 2020-03-06

## 2020-03-06 NOTE — ASSESSMENT & PLAN NOTE
Patient appears to be significantly weaker than before  He is having [facility] come out to him, so it is likely they are going to be doing physical therapy with him as well  Once [facility] services are discontinued, would recommend starting physical therapy for his deconditioning

## 2020-03-06 NOTE — PROGRESS NOTES
Assessment and Plan:     Problem List Items Addressed This Visit        Cardiovascular and Mediastinum    Atrial fibrillation ([facility] ) - Primary    Hypertensive heart disease with congestive heart failure (HCC)       Nervous and Auditory    Lumbosacral spondylosis with radiculopathy           Preventive health issues were discussed with patient, and age appropriate screening tests were ordered as noted in patient's After Visit Summary  Personalized health advice and appropriate referrals for health education or preventive services given if needed, as noted in patient's After Visit Summary       History of Present Illness:     Patient presents for Medicare Annual Wellness visit    Patient Care Team:  Amado Alva MD as PCP - General (Family Medicine)  ROXI Armenta MD Rudolm Bring, PA-C Conway Medley, PA-C     Problem List:     Patient Active Problem List   Diagnosis    Allergic rhinitis    Ambulatory dysfunction    Anemia of chronic disease    Arteriosclerosis of coronary artery    Asthma    Atrial fibrillation ([facility] )    Chronic sinusitis    Congestive heart failure ([facility] )    Coronary atherosclerosis    ESRD (end stage renal disease) on dialysis ([facility] )    Hypertensive heart disease with congestive heart failure ([facility] )    Fatigue    Generalized osteoarthritis of multiple sites    GI bleeding    HLD (hyperlipidemia)    Ischemic cardiomyopathy    Obesity    Periodontal disease    Secondary hyperparathyroidism ([facility] )    Sleep apnea    Tobacco abuse    Vitamin D deficiency    Nasal septal deviation    Acute on chronic combined systolic and diastolic congestive heart failure (HCC)    Acute pain of right knee    Acute respiratory failure with hypoxia (HCC)    Chronic diastolic (congestive) heart failure (HCC)    Hyperthyroidism    Hypoalbuminemia    Low blood pressure    Lumbosacral spondylosis with radiculopathy    Membranous glomerulonephritis    Mitral regurgitation    Monocular diplopia, right eye    Ophthalmoplegia of right eye    Physical deconditioning    Pulmonary hypertension (HCC)    Stented coronary artery    T12 compression fracture (HCC)    Tear of meniscus of knee    Volume overload    Ocular hypertension of right eye    Pressure injury of skin with infection    Pressure injury, unstageable, with infection (HCC)      Past Medical and Surgical History:     Past Medical History:   Diagnosis Date    Acute myocardial infarction ([address] )     resolved     Stroke syndrome     resolved      Past Surgical History:   Procedure Laterality Date    CORONARY ANGIOPLASTY WITH STENT PLACEMENT      stent 1    HERNIA REPAIR      LAPAROSCOPIC CHOLECYSTECTOMY  2017    OTHER SURGICAL HISTORY      hemodialysis access type arteriovenous fistula    PARATHYROID GLAND SURGERY      resection      Family History:     Family History   Problem Relation Age of Onset    Pneumonia Mother         bacterial    Coronary artery disease Mother     Other Brother         end stage renal disease    Hypertension Brother       Social History:        Social History     Socioeconomic History    Marital status:      Spouse name: None    Number of children: None    Years of education: None    Highest education level: None   Occupational History    Occupation: [employer]    Social Needs    Financial resource strain: None    Food insecurity:     Worry: None     Inability: None    Transportation needs:     Medical: None     Non-medical: None   Tobacco Use    Smoking status: Former Smoker     Last attempt to quit: 2020     Years since quittin 1    Smokeless tobacco: Never Used    Tobacco comment: tobacco use per allscripts   Substance and Sexual Activity    Alcohol use: No    Drug use: No    Sexual activity: None   Lifestyle    Physical activity:     Days per week: None     Minutes per session: None    Stress: None Relationships    Social connections:     Talks on phone: None     Gets together: None     Attends Orthodox service: None     Active member of club or organization: None     Attends meetings of clubs or organizations: None     Relationship status: None    Intimate partner violence:     Fear of current or ex partner: None     Emotionally abused: None     Physically abused: None     Forced sexual activity: None   Other Topics Concern    None   Social History Narrative    Native language Amharic from [facility]      Medications and Allergies:     Current Outpatient Medications   Medication Sig Dispense Refill    acetaminophen (TYLENOL) 650 mg CR tablet Take 1 tablet by mouth      amiodarone 200 mg tablet       aspirin 81 mg chewable tablet Chew 81 mg      atorvastatin (LIPITOR) 40 mg tablet TAKE 1 TABLET (40 MG TOTAL) BY MOUTH DAILY AT BEDTIME 30 tablet 2    calcitriol (ROCALTROL) 0 25 mcg capsule Take 1 25 mcg by mouth      calcium acetate (PHOSLO) CAPS capsule Take 2,001 mg by mouth      cinacalcet (SENSIPAR) 60 MG tablet Take by mouth      ELIQUIS 5 MG 5 mg 2 (two) times a day       ergocalciferol (ERGOCALCIFEROL) 95663 units capsule Take 1 capsule by mouth once a week      fluticasone (FLONASE) 50 mcg/act nasal spray 1 spray into each nostril      hydrOXYzine HCL (ATARAX) 25 mg tablet Take 25 mg by mouth Three times daily as needed      Lidocaine 4 % PTCH Place 1 patch on the skin daily      metoprolol tartrate (LOPRESSOR) 25 mg tablet TAKE 0 5 TABLETS (12 5 MG TOTAL) BY MOUTH EVERY 12 (TWELVE) HOURS (Patient taking differently: Take 25 mg by mouth daily ) 30 tablet 0    midodrine (PROAMATINE) 5 mg tablet       Multiple Vitamins-Minerals (PRORENAL + D) TABS Take 1 tablet by mouth daily  3    SEA SOFT NASAL MIST 0 65 % nasal spray instill 1 spray into each nostril if needed  1    sodium polystyrene (KAYEXALATE) powder       pantoprazole (PROTONIX) 40 mg tablet Take 40 mg by mouth       No current facility-administered medications for this visit  No Known Allergies   Immunizations:     Immunization History   Administered Date(s) Administered    INFLUENZA 01/30/2014, 10/01/2019    Tdap 10/19/2015      Health Maintenance:         Topic Date Due    CRC Screening: Colonoscopy  1955    CRC Screening: FOBTx3/FIT  01/19/2005    Hepatitis C Screening  02/14/2021 (Originally 1955)     There are no preventive care reminders to display for this patient  Medicare Health Risk Assessment:     /58 (BP Location: Left arm, Patient Position: Sitting)   Pulse 100   Ht 5' 8" (1 727 m)   Wt 92 7 kg (204 lb 6 4 oz)   BMI 31 08 kg/m²      Ja is here for his Subsequent Wellness visit  Health Risk Assessment:   Patient rates overall health as fair  Patient feels that their physical health rating is same  Eyesight was rated as same  Hearing was rated as same  Patient feels that their emotional and mental health rating is same  Pain experienced in the last 7 days has been a lot  Patient's pain rating has been 5/10  Patient states that he has experienced weight loss or gain in last 6 months  Edema    Fall Risk Screening: In the past year, patient has experienced: no history of falling in past year      Home Safety:  Patient has trouble with stairs inside or outside of their home  Patient has working smoke alarms and has no working carbon monoxide detector  Home safety hazards include: none  Nutrition:   Current diet is Regular  Medications:   Patient is currently taking over-the-counter supplements  OTC medications include: see medication list  Patient is able to manage medications  Activities of Daily Living (ADLs)/Instrumental Activities of Daily Living (IADLs):   Walk and transfer into and out of bed and chair?: Yes  Dress and groom yourself?: Yes    Bathe or shower yourself?: Yes    Feed yourself?  Yes  Do your laundry/housekeeping?: Yes  Manage your money, pay your bills and track your expenses?: No  Make your own meals?: Yes    Do your own shopping?: Yes    Previous Hospitalizations:   Any hospitalizations or ED visits within the last 12 months?: Yes    How many hospitalizations have you had in the last year?: 1-2    Advance Care Planning:   Living will: Yes    Durable POA for healthcare: Yes    Advanced directive: Yes      Cognitive Screening:   Provider or family/friend/caregiver concerned regarding cognition?: No    PREVENTIVE SCREENINGS      Cardiovascular Screening:    General: Screening Not Indicated and History Lipid Disorder      Diabetes Screening:     General: Risks and Benefits Discussed and Screening Not Indicated      Colorectal Cancer Screening:     General: Risks and Benefits Discussed and Patient Declines      Prostate Cancer Screening:    General: Risks and Benefits Discussed and Patient Declines      Osteoporosis Screening:    General: Risks and Benefits Discussed      Abdominal Aortic Aneurysm (AAA) Screening:    Risk factors include: age between 73-69 yo and tobacco use        Lung Cancer Screening:     General: Risks and Benefits Discussed      Hepatitis C Screening:    General: Screening Current      Preventive Screening Comments: Patient does currently smoke  Will NOT refer to lung cancer screening due to current medical concerns        Virgen Georges MD

## 2020-03-06 NOTE — PATIENT INSTRUCTIONS
Problem List Items Addressed This Visit     Atrial fibrillation ([facility] ) - Primary     Patient's heart rate today is irregular  This is consistent with atrial fibrillation  Continue on anticoagulation, and follow with Cardiology  Of note, did not appear to be tachycardic, just irregular  Relevant Medications    ELIQUIS 5 MG    metoprolol succinate (TOPROL-XL) 25 mg 24 hr tablet    Other Relevant Orders    Ambulatory referral to Cardiology    Congestive heart failure ([facility] )     Wt Readings from Last 3 Encounters:   03/06/20 92 7 kg (204 lb 6 4 oz)   02/14/20 99 3 kg (219 lb)   11/13/19 95 3 kg (210 lb)     CHF is going to be followed by Cardiology as well as nephrology  They are going to be managing his fluid balance  No change at the moment  Patient still seems to be quite disabled, question if it is secondary to heart failure versus other  Again, follow with cardiology and nephrology  Relevant Medications    metoprolol succinate (TOPROL-XL) 25 mg 24 hr tablet    Other Relevant Orders    Ambulatory referral to Cardiology    ESRD (end stage renal disease) on dialysis [facility])     Patient had a dialysis change, going more frequently  Follow with them  Hypertensive heart disease with congestive heart failure (HCC)     Wt Readings from Last 3 Encounters:   03/06/20 92 7 kg (204 lb 6 4 oz)   02/14/20 99 3 kg (219 lb)   11/13/19 95 3 kg (210 lb)     Blood pressure today is slightly low  He was getting midodrine from either Nephrology or Cardiology  He should continue to have that  I am concerned that he does have low blood pressures, especially after dialysis  Relevant Medications    metoprolol succinate (TOPROL-XL) 25 mg 24 hr tablet    Other Relevant Orders    Ambulatory referral to Cardiology    Lumbosacral spondylosis with radiculopathy     No change at the moment    Has had in the past          Relevant Medications    oxyCODONE (ROXICODONE) 5 mg immediate release tablet Physical deconditioning     Patient appears to be significantly weaker than before  He is having [facility] come out to him, so it is likely they are going to be doing physical therapy with him as well  Once [facility] services are discontinued, would recommend starting physical therapy for his deconditioning  Ulcer of right lower extremity, limited to breakdown of skin [facility])     Patient continues to have a significant amount of pain in the right lower extremity, requiring Percocet  At this point, I will give him a small supply of the Percocet as he should be getting this from the wound center  I will also check x-ray of the right lower extremity, tib fib, to rule out osteomyelitis with continued open wound and increased pain  Patient will follow-up with Bessy Manzo,[address], as well as the [facility]  Relevant Orders    Ambulatory referral to Wound Care    XR tibia fibula 2 vw right    Vitamin D deficiency     Noted previously  Will consider further follow-up in the future

## 2020-03-06 NOTE — ASSESSMENT & PLAN NOTE
Wt Readings from Last 3 Encounters:   03/06/20 92 7 kg (204 lb 6 4 oz)   02/14/20 99 3 kg (219 lb)   11/13/19 95 3 kg (210 lb)     Blood pressure today is slightly low  He was getting midodrine from either Nephrology or Cardiology  He should continue to have that  I am concerned that he does have low blood pressures, especially after dialysis

## 2020-03-06 NOTE — PROGRESS NOTES
Assessment and Plan:    Problem List Items Addressed This Visit     Atrial fibrillation ([facility] ) - Primary     Patient's heart rate today is irregular  This is consistent with atrial fibrillation  Continue on anticoagulation, and follow with Cardiology  Of note, did not appear to be tachycardic, just irregular  Relevant Medications    ELIQUIS 5 MG    metoprolol succinate (TOPROL-XL) 25 mg 24 hr tablet    Other Relevant Orders    Ambulatory referral to Cardiology    Congestive heart failure ([facility] )     Wt Readings from Last 3 Encounters:   03/06/20 92 7 kg (204 lb 6 4 oz)   02/14/20 99 3 kg (219 lb)   11/13/19 95 3 kg (210 lb)     CHF is going to be followed by Cardiology as well as nephrology  They are going to be managing his fluid balance  No change at the moment  Patient still seems to be quite disabled, question if it is secondary to heart failure versus other  Again, follow with cardiology and nephrology  Relevant Medications    metoprolol succinate (TOPROL-XL) 25 mg 24 hr tablet    Other Relevant Orders    Ambulatory referral to Cardiology    ESRD (end stage renal disease) on dialysis [facility])     Patient had a dialysis change, going more frequently  Follow with them  Hypertensive heart disease with congestive heart failure (HCC)     Wt Readings from Last 3 Encounters:   03/06/20 92 7 kg (204 lb 6 4 oz)   02/14/20 99 3 kg (219 lb)   11/13/19 95 3 kg (210 lb)     Blood pressure today is slightly low  He was getting midodrine from either Nephrology or Cardiology  He should continue to have that  I am concerned that he does have low blood pressures, especially after dialysis  Relevant Medications    metoprolol succinate (TOPROL-XL) 25 mg 24 hr tablet    Other Relevant Orders    Ambulatory referral to Cardiology    Lumbosacral spondylosis with radiculopathy     No change at the moment    Has had in the past          Relevant Medications    oxyCODONE (ROXICODONE) 5 mg immediate release tablet    Physical deconditioning     Patient appears to be significantly weaker than before  He is having [facility] come out to him, so it is likely they are going to be doing physical therapy with him as well  Once VNA services are discontinued, would recommend starting physical therapy for his deconditioning  Ulcer of right lower extremity, limited to breakdown of skin [facility])     Patient continues to have a significant amount of pain in the right lower extremity, requiring Percocet  At this point, I will give him a small supply of the Percocet as he should be getting this from the wound center  I will also check x-ray of the right lower extremity, tib fib, to rule out osteomyelitis with continued open wound and increased pain  Patient will follow-up with [facility]y Manzo,[address], as well as the [facility]  Relevant Orders    Ambulatory referral to Wound Care    XR tibia fibula 2 vw right    Vitamin D deficiency     Noted previously  Will consider further follow-up in the future  Diagnoses and all orders for this visit:    Paroxysmal atrial fibrillation ([address] )  -     ELIQUIS 5 MG; Take 1 tablet (5 mg total) by mouth 2 (two) times a day  -     Ambulatory referral to Cardiology; Future  -     metoprolol succinate (TOPROL-XL) 25 mg 24 hr tablet; Take 1 tablet (25 mg total) by mouth daily    Hypertensive heart disease with congestive heart failure, unspecified heart failure type [facility])  -     Ambulatory referral to Cardiology; Future  -     metoprolol succinate (TOPROL-XL) 25 mg 24 hr tablet; Take 1 tablet (25 mg total) by mouth daily    Lumbosacral spondylosis with radiculopathy  -     oxyCODONE (ROXICODONE) 5 mg immediate release tablet; Take 1 tablet (5 mg total) by mouth every 8 (eight) hoursMax Daily Amount: 15 mg    Ulcer of right lower extremity, limited to breakdown of skin ([address] )  -     Ambulatory referral to Wound Care;  Future  -     XR tibia fibula 2 vw right; Future    ESRD (end stage renal disease) on dialysis ([facility] )    Chronic diastolic congestive heart failure ([facility] )  -     Ambulatory referral to Cardiology; Future  -     metoprolol succinate (TOPROL-XL) 25 mg 24 hr tablet; Take 1 tablet (25 mg total) by mouth daily    Physical deconditioning    Vitamin D deficiency    Other orders  -     Cancel: metoprolol tartrate (LOPRESSOR) 25 mg tablet; Take 1 tablet (25 mg total) by mouth daily              Subjective:      Patient ID: Devante Linton is a 72 y o  male  CC:    Chief Complaint   [facility] presents [facility]   [facility] renew medication given in [address], change in Metoprolol and Oxycodone for the wound pain  kw    Medicare Wellness Visit       HPI:    Patient was recently in the hospital at [address] [place] [address]  He had open wounds that were infected  Patient is to follow-up with the wound center at [address] [place] [address] for this as well  He has not made the appointment yet  Patient was discharged from the hospital on the 24th of February  Since discharge, he has had a visiting nurse come to his home to change the dressing  He was using oxycodone that was prescribed by the hospital   Patient reports the current pain is right lower extremity is 5/10  When he goes to stand or walk, it ranges from 7 to 8  Patient is on dialysis for end-stage renal disease, and had that adjusted to 4 times a week, rather than 3 times a week  Also after discharge from hospital, the patient had outpatient elective cardioversion  He did have conversion to normal sinus rhythm at this procedure  This was done at [facility]  He is to have cardio appointment on 11Mar2020  CHF: Has had some issues with this  Seeing Cardio for this  On multiple meds, including:  Metoprolol succinate, 25 mg daily  With regard to the CKD:  Patient is on dialysis at this point  Currently using Sensipar, midodrine, calcium acetate (PhosLo), Calcitrol, vitamin-D  Atrial fibrillation:  Currently on Eliquis  Also using metoprolol for heart rate, and amiodarone to continue with sinus rhythm  Wound: He is currently having pain  He was using it about q 6  Continues to have pain in the leg  He also is on Keflex after discharge from hospital     Smoking: He is still smoking about "5 every day "          The following portions of the patient's history were reviewed and updated as appropriate: allergies, current medications, past family history, past medical history, past social history, past surgical history and problem list       Review of Systems   Constitutional: Positive for activity change, chills (notes at home ) and fatigue  HENT: Negative  Eyes: Positive for visual disturbance (double vision  Started about 5 years ago  )  Respiratory: Negative  Cardiovascular: Positive for leg swelling  Negative for chest pain and palpitations  Gastrointestinal: Negative  Diarrhea: occasional    Endocrine: Negative  Genitourinary: Negative  Musculoskeletal: Negative  Skin: Positive for wound  Allergic/Immunologic: Negative  Neurological: Positive for numbness (occasional, with dialysis  In fingers  )  Hematological: Negative  Psychiatric/Behavioral: Negative  All other systems reviewed and are negative  Data to review:       Objective:    Vitals:    03/06/20 1343   BP: 100/58   BP Location: Left arm   Patient Position: Sitting   Pulse: 100   Weight: 92 7 kg (204 lb 6 4 oz)   Height: 5' 8" (1 727 m)        Physical Exam   Constitutional: He is oriented to person, place, and time  He appears well-developed and well-nourished  No distress  HENT:   Head: Normocephalic and atraumatic  Right Ear: Hearing, tympanic membrane, external ear and ear canal normal    Left Ear: Hearing, tympanic membrane, external ear and ear canal normal    Nose: Nose normal  Right sinus exhibits no maxillary sinus tenderness and no frontal sinus tenderness   Left sinus exhibits no maxillary sinus tenderness and no frontal sinus tenderness  Mouth/Throat: Uvula is midline, oropharynx is clear and moist and mucous membranes are normal  No oropharyngeal exudate  Tonsils are 0 on the right  Tonsils are 0 on the left  No tonsillar exudate  Poor dentition  Eyes: Pupils are equal, round, and reactive to light  Conjunctivae and EOM are normal  Lids are everted and swept, no foreign bodies found  Neck: Normal range of motion  Neck supple  Carotid bruit is not present  No tracheal deviation present  No thyromegaly present  Cardiovascular: Normal rate, normal heart sounds and intact distal pulses  An irregularly irregular rhythm present  Exam reveals no gallop and no friction rub  No murmur heard  Pulses:       Carotid pulses are 2+ on the right side, and 2+ on the left side  Pulmonary/Chest: Effort normal and breath sounds normal  No respiratory distress  He has no wheezes  He has no rales  Abdominal: Soft  Bowel sounds are normal  He exhibits no distension  There is no tenderness  Lymphadenopathy:     He has no cervical adenopathy  Neurological: He is alert and oriented to person, place, and time  Coordination normal    Skin: Skin is warm and dry  He is not diaphoretic  Psychiatric: He has a normal mood and affect  His behavior is normal  Judgment and thought content normal    Nursing note and vitals reviewed

## 2020-03-06 NOTE — ASSESSMENT & PLAN NOTE
Patient continues to have a significant amount of pain in the right lower extremity, requiring Percocet  At this point, I will give him a small supply of the Percocet as he should be getting this from the wound center  I will also check x-ray of the right lower extremity, tib fib, to rule out osteomyelitis with continued open wound and increased pain  Patient will follow-up with [facility] Manzo,[address], as well as the [facility]

## 2020-03-06 NOTE — ASSESSMENT & PLAN NOTE
Patient's heart rate today is irregular  This is consistent with atrial fibrillation  Continue on anticoagulation, and follow with Cardiology  Of note, did not appear to be tachycardic, just irregular

## 2020-03-06 NOTE — ASSESSMENT & PLAN NOTE
Wt Readings from Last 3 Encounters:   03/06/20 92 7 kg (204 lb 6 4 oz)   02/14/20 99 3 kg (219 lb)   11/13/19 95 3 kg (210 lb)     CHF is going to be followed by Cardiology as well as nephrology  They are going to be managing his fluid balance  No change at the moment  Patient still seems to be quite disabled, question if it is secondary to heart failure versus other  Again, follow with cardiology and nephrology

## 2020-03-20 DIAGNOSIS — M47.27 LUMBOSACRAL SPONDYLOSIS WITH RADICULOPATHY: ICD-10-CM

## 2020-03-20 RX ORDER — OXYCODONE HYDROCHLORIDE 5 MG/1
5 TABLET ORAL EVERY 8 HOURS
Qty: 20 TABLET | Refills: 0 | OUTPATIENT
Start: 2020-03-20

## 2020-03-20 RX ORDER — MIDODRINE HYDROCHLORIDE 5 MG/1
TABLET ORAL
Qty: 90 TABLET | Refills: 5 | OUTPATIENT
Start: 2020-03-20

## 2020-03-21 DIAGNOSIS — M47.27 LUMBOSACRAL SPONDYLOSIS WITH RADICULOPATHY: ICD-10-CM

## 2020-03-24 RX ORDER — MIDODRINE HYDROCHLORIDE 5 MG/1
TABLET ORAL
Qty: 90 TABLET | Refills: 5 | OUTPATIENT
Start: 2020-03-24

## 2020-03-24 RX ORDER — OXYCODONE HYDROCHLORIDE 5 MG/1
5 TABLET ORAL EVERY 8 HOURS
Qty: 20 TABLET | Refills: 0 | OUTPATIENT
Start: 2020-03-24

## 2020-03-27 ENCOUNTER — OFFICE VISIT (OUTPATIENT)
Dept: FAMILY MEDICINE CLINIC | Facility: CLINIC | Age: 65
End: 2020-03-27
Payer: MEDICARE

## 2020-03-27 VITALS
HEART RATE: 88 BPM | BODY MASS INDEX: 31.4 KG/M2 | SYSTOLIC BLOOD PRESSURE: 126 MMHG | DIASTOLIC BLOOD PRESSURE: 70 MMHG | HEIGHT: 68 IN | WEIGHT: 207.2 LBS

## 2020-03-27 DIAGNOSIS — L97.911 ULCER OF RIGHT LOWER EXTREMITY, LIMITED TO BREAKDOWN OF SKIN (HCC): Primary | ICD-10-CM

## 2020-03-27 DIAGNOSIS — I11.0 HYPERTENSIVE HEART DISEASE WITH CONGESTIVE HEART FAILURE, UNSPECIFIED HEART FAILURE TYPE (HCC): ICD-10-CM

## 2020-03-27 DIAGNOSIS — N18.6 ESRD (END STAGE RENAL DISEASE) ON DIALYSIS (HCC): ICD-10-CM

## 2020-03-27 DIAGNOSIS — Z99.2 ESRD (END STAGE RENAL DISEASE) ON DIALYSIS (HCC): ICD-10-CM

## 2020-03-27 DIAGNOSIS — M47.27 LUMBOSACRAL SPONDYLOSIS WITH RADICULOPATHY: ICD-10-CM

## 2020-03-27 DIAGNOSIS — I50.32 CHRONIC DIASTOLIC CONGESTIVE HEART FAILURE (HCC): ICD-10-CM

## 2020-03-27 DIAGNOSIS — I48.0 PAROXYSMAL ATRIAL FIBRILLATION (HCC): ICD-10-CM

## 2020-03-27 PROCEDURE — 1036F TOBACCO NON-USER: CPT | Performed by: FAMILY MEDICINE

## 2020-03-27 PROCEDURE — 3008F BODY MASS INDEX DOCD: CPT | Performed by: FAMILY MEDICINE

## 2020-03-27 PROCEDURE — 99214 OFFICE O/P EST MOD 30 MIN: CPT | Performed by: FAMILY MEDICINE

## 2020-03-27 RX ORDER — MIDODRINE HYDROCHLORIDE 5 MG/1
5 TABLET ORAL 3 TIMES DAILY
Qty: 90 TABLET | Refills: 2 | Status: CANCELLED | OUTPATIENT
Start: 2020-03-27

## 2020-03-27 RX ORDER — OXYCODONE HYDROCHLORIDE 5 MG/1
5 TABLET ORAL EVERY 8 HOURS
Qty: 20 TABLET | Refills: 0 | Status: SHIPPED | OUTPATIENT
Start: 2020-03-27 | End: 2020-08-05 | Stop reason: SDUPTHER

## 2020-03-27 NOTE — PATIENT INSTRUCTIONS
Problem List Items Addressed This Visit     Atrial fibrillation [facility])     Patient continues to have atrial fibrillation  No current problems with it, just noted  Currently using Eliquis  Congestive heart failure (HCC)     Wt Readings from Last 3 Encounters:   03/27/20 94 kg (207 lb 3 2 oz)   03/06/20 92 7 kg (204 lb 6 4 oz)   02/14/20 99 3 kg (219 lb)   Stable for now  Weight management of this should be from Nephrology and Cardiology  No changes from here  ESRD (end stage renal disease) on dialysis ([address] )     Stable at the moment  Patient following with nephrology at [facility], as well as Cardiology at [facility]  He did ask about renewal of midodrine, but I would ask that he call Cardiology for this as this is much more specialized than primary care, especially as it is used after dialysis to maintain blood pressure  The other alternative is Nephrology for it  Hypertensive heart disease with congestive heart failure (HCC)     Wt Readings from Last 3 Encounters:   03/27/20 94 kg (207 lb 3 2 oz)   03/06/20 92 7 kg (204 lb 6 4 oz)   02/14/20 99 3 kg (219 lb)       Blood pressure today was quite good  No changes  Lumbosacral spondylosis with radiculopathy    Relevant Medications    oxyCODONE (ROXICODONE) 5 mg immediate release tablet    Ulcer of right lower extremity, limited to breakdown of skin ([address] ) - Primary     Patient reports the VNA S told him that the wound seems to be improving  The only note that I see from wound center was approximately 1 month ago  Would recommend that the patient follow with [address], he was given the phone number again  Had been seen by the wound center at [facility]  I would recommend that he talk with them about pain control, but in the short term I will give him 1 prescription for oxycodone  This is an acute pain prescription only    Based on that, I did review with him the potential for addiction, as well as diversion  We did discuss contract, but patient did not have his glasses to be able to read the contract today  Patient clearly understood that there are risks with oxycodone  FURTHER RX SHOULD BE FROM WOUND CENTER PROVIDER           Relevant Orders    Ambulatory referral to Wound Care

## 2020-03-27 NOTE — ASSESSMENT & PLAN NOTE
Wt Readings from Last 3 Encounters:   03/27/20 94 kg (207 lb 3 2 oz)   03/06/20 92 7 kg (204 lb 6 4 oz)   02/14/20 99 3 kg (219 lb)       Blood pressure today was quite good  No changes

## 2020-03-27 NOTE — ASSESSMENT & PLAN NOTE
Patient continues to have atrial fibrillation  No current problems with it, just noted  Currently using Eliquis

## 2020-03-27 NOTE — ASSESSMENT & PLAN NOTE
Patient reports the VNA S told him that the wound seems to be improving  The only note that I see from wound center was approximately 1 month ago  Would recommend that the patient follow with [address], he was given the phone number again  Had been seen by the wound center at [facility]  I would recommend that he talk with them about pain control, but in the short term I will give him 1 prescription for oxycodone  This is an acute pain prescription only  Based on that, I did review with him the potential for addiction, as well as diversion  We did discuss contract, but patient did not have his glasses to be able to read the contract today  Patient clearly understood that there are risks with oxycodone  FURTHER RX SHOULD BE FROM WOUND CENTER PROVIDER

## 2020-03-27 NOTE — ASSESSMENT & PLAN NOTE
Wt Readings from Last 3 Encounters:   03/27/20 94 kg (207 lb 3 2 oz)   03/06/20 92 7 kg (204 lb 6 4 oz)   02/14/20 99 3 kg (219 lb)   Stable for now  Weight management of this should be from Nephrology and Cardiology  No changes from here

## 2020-03-27 NOTE — PROGRESS NOTES
Assessment and Plan:    Problem List Items Addressed This Visit     Atrial fibrillation [facility])     Patient continues to have atrial fibrillation  No current problems with it, just noted  Currently using Eliquis  Congestive heart failure (HCC)     Wt Readings from Last 3 Encounters:   03/27/20 94 kg (207 lb 3 2 oz)   03/06/20 92 7 kg (204 lb 6 4 oz)   02/14/20 99 3 kg (219 lb)   Stable for now  Weight management of this should be from Nephrology and Cardiology  No changes from here  ESRD (end stage renal disease) on dialysis ([address] )     Stable at the moment  Patient following with nephrology at [facility], as well as Cardiology at [facility]  He did ask about renewal of midodrine, but I would ask that he call Cardiology for this as this is much more specialized than primary care, especially as it is used after dialysis to maintain blood pressure  The other alternative is Nephrology for it  Hypertensive heart disease with congestive heart failure (HCC)     Wt Readings from Last 3 Encounters:   03/27/20 94 kg (207 lb 3 2 oz)   03/06/20 92 7 kg (204 lb 6 4 oz)   02/14/20 99 3 kg (219 lb)       Blood pressure today was quite good  No changes  Lumbosacral spondylosis with radiculopathy    Relevant Medications    oxyCODONE (ROXICODONE) 5 mg immediate release tablet    Ulcer of right lower extremity, limited to breakdown of skin ([address] ) - Primary     Patient reports the VNA S told him that the wound seems to be improving  The only note that I see from wound center was approximately 1 month ago  Would recommend that the patient follow with [address], he was given the phone number again  Had been seen by the wound center at [facility]  I would recommend that he talk with them about pain control, but in the short term I will give him 1 prescription for oxycodone  This is an acute pain prescription only    Based on that, I did review with him the potential for addiction, as well as diversion  We did discuss contract, but patient did not have his glasses to be able to read the contract today  Patient clearly understood that there are risks with oxycodone  FURTHER RX SHOULD BE FROM WOUND CENTER PROVIDER  Relevant Orders    Ambulatory referral to Wound Care                 Diagnoses and all orders for this visit:    Ulcer of right lower extremity, limited to breakdown of skin ([facility] )  -     Ambulatory referral to Wound Care; Future    ESRD (end stage renal disease) on dialysis ([facility] )    Chronic diastolic congestive heart failure (HCC)    Hypertensive heart disease with congestive heart failure, unspecified heart failure type (HCC)    Paroxysmal atrial fibrillation (HCC)    Lumbosacral spondylosis with radiculopathy  -     oxyCODONE (ROXICODONE) 5 mg immediate release tablet; Take 1 tablet (5 mg total) by mouth every 8 (eight) hoursMax Daily Amount: 15 mg    Other orders  -     Cancel: midodrine (PROAMATINE) 5 mg tablet; Take 1 tablet (5 mg total) by mouth 3 (three) times a day              Subjective:      Patient ID: Robert Palmer is a 72 y o  male  CC:    Chief Complaint   Patient presents with    Follow-up     Pt states he is still getting daily wound care nurse coming out to him  He states it is doing better but it has some discomfort  He states his pain level at time can be a 9 or 10  kw    Medication Refill     Pt needs Midodrine and would like Oxycodone for his pain  kw       HPI:    Patient here to follow up  He has had some irritation of the leg  VNA is changing dressing  He mentioned he has some irritation around to would, feels like an electric shock  Reports pain is an 8-9  He has been using Oxycodone for the leg  He has ESRD, on dialysis  Has been using the Midodrine            The following portions of the patient's history were reviewed and updated as appropriate: allergies, current medications, past family history, past medical history, past social history, past surgical history and problem list       Review of Systems      Data to review:       Objective:    Vitals:    03/27/20 0830   BP: 126/70   BP Location: Left arm   Patient Position: Sitting   Pulse: 88   Weight: 94 kg (207 lb 3 2 oz)   Height: 5' 8" (1 727 m)        Physical Exam   Constitutional: He appears well-developed and well-nourished  HENT:   Head: Normocephalic and atraumatic  Neck: Normal range of motion  Neck supple  Cardiovascular: Normal rate and normal heart sounds  An irregularly irregular rhythm present  Exam reveals no gallop and no friction rub  No murmur heard  Pulses:       Carotid pulses are 2+ on the right side, and 2+ on the left side  Pulmonary/Chest: Effort normal and breath sounds normal  No respiratory distress  He has no wheezes  He has no rales  Skin:        Nursing note and vitals reviewed

## 2020-03-27 NOTE — ASSESSMENT & PLAN NOTE
Stable at the moment  Patient following with nephrology at [address] [place] [address] [address], as well as Cardiology at [address] [place] [address]  He did ask about renewal of midodrine, but I would ask that he call Cardiology for this as this is much more specialized than primary care, especially as it is used after dialysis to maintain blood pressure  The other alternative is Nephrology for it

## 2020-03-29 DIAGNOSIS — E78.5 HYPERLIPIDEMIA, UNSPECIFIED HYPERLIPIDEMIA TYPE: ICD-10-CM

## 2020-03-30 RX ORDER — ATORVASTATIN CALCIUM 40 MG/1
40 TABLET, FILM COATED ORAL
Qty: 30 TABLET | Refills: 2 | OUTPATIENT
Start: 2020-03-30

## 2020-04-24 DIAGNOSIS — E78.5 HYPERLIPIDEMIA, UNSPECIFIED HYPERLIPIDEMIA TYPE: ICD-10-CM

## 2020-04-24 RX ORDER — ATORVASTATIN CALCIUM 40 MG/1
40 TABLET, FILM COATED ORAL
Qty: 30 TABLET | Refills: 2 | Status: SHIPPED | OUTPATIENT
Start: 2020-04-24 | End: 2020-06-19

## 2020-06-17 DIAGNOSIS — E78.5 HYPERLIPIDEMIA, UNSPECIFIED HYPERLIPIDEMIA TYPE: ICD-10-CM

## 2020-06-19 RX ORDER — ATORVASTATIN CALCIUM 40 MG/1
40 TABLET, FILM COATED ORAL
Qty: 30 TABLET | Refills: 2 | Status: SHIPPED | OUTPATIENT
Start: 2020-06-19 | End: 2020-09-04

## 2020-07-17 RX ORDER — NEBULIZER AND COMPRESSOR
EACH MISCELLANEOUS
Qty: 1 EACH | Refills: 0 | OUTPATIENT
Start: 2020-07-17

## 2020-07-17 RX ORDER — ALBUTEROL SULFATE 2.5 MG/3ML
SOLUTION RESPIRATORY (INHALATION)
Qty: 540 ML | Refills: 2 | OUTPATIENT
Start: 2020-07-17

## 2020-07-20 RX ORDER — NEBULIZER AND COMPRESSOR
EACH MISCELLANEOUS
Qty: 1 EACH | Refills: 0 | OUTPATIENT
Start: 2020-07-20

## 2020-07-20 RX ORDER — ALBUTEROL SULFATE 2.5 MG/3ML
SOLUTION RESPIRATORY (INHALATION)
Qty: 540 ML | Refills: 5 | OUTPATIENT
Start: 2020-07-20

## 2020-07-21 NOTE — TELEPHONE ENCOUNTER
Patient needs OV  This is a very complicated patient and SOB may not just be due to Bronchospasm, and even if it is, he has not been seen since severe problems with last admission  MUST HAVE OV

## 2020-07-23 RX ORDER — NEBULIZER AND COMPRESSOR
EACH MISCELLANEOUS
Qty: 1 EACH | Refills: 0 | OUTPATIENT
Start: 2020-07-23

## 2020-07-23 RX ORDER — ALBUTEROL SULFATE 2.5 MG/3ML
SOLUTION RESPIRATORY (INHALATION)
Qty: 540 ML | Refills: 2 | OUTPATIENT
Start: 2020-07-23

## 2020-07-29 RX ORDER — ALBUTEROL SULFATE 2.5 MG/3ML
SOLUTION RESPIRATORY (INHALATION)
Qty: 540 ML | Refills: 5 | OUTPATIENT
Start: 2020-07-29

## 2020-07-29 RX ORDER — NEBULIZER AND COMPRESSOR
EACH MISCELLANEOUS
Qty: 1 EACH | Refills: 0 | OUTPATIENT
Start: 2020-07-29

## 2020-07-30 NOTE — TELEPHONE ENCOUNTER
Same as the last refusal, patient is extremely complicated and has not been in since his recent hospital discharge  Needs to have office visit prior to a adding this nebulizer and albuterol  We have not seen him for this, and I do not feel comfortable refilling this medication at this time

## 2020-08-05 ENCOUNTER — OFFICE VISIT (OUTPATIENT)
Dept: FAMILY MEDICINE CLINIC | Facility: CLINIC | Age: 65
End: 2020-08-05
Payer: MEDICARE

## 2020-08-05 VITALS
HEIGHT: 68 IN | DIASTOLIC BLOOD PRESSURE: 62 MMHG | BODY MASS INDEX: 23.64 KG/M2 | HEART RATE: 80 BPM | WEIGHT: 156 LBS | RESPIRATION RATE: 24 BRPM | SYSTOLIC BLOOD PRESSURE: 104 MMHG | TEMPERATURE: 97.8 F

## 2020-08-05 DIAGNOSIS — L89.95 PRESSURE INJURY, UNSTAGEABLE, WITH INFECTION (HCC): Primary | ICD-10-CM

## 2020-08-05 DIAGNOSIS — Z79.01 ANTICOAGULANT LONG-TERM USE: ICD-10-CM

## 2020-08-05 DIAGNOSIS — N18.6 ESRD (END STAGE RENAL DISEASE) ON DIALYSIS (HCC): ICD-10-CM

## 2020-08-05 DIAGNOSIS — Z99.2 ESRD (END STAGE RENAL DISEASE) ON DIALYSIS (HCC): ICD-10-CM

## 2020-08-05 DIAGNOSIS — I50.42 CHRONIC COMBINED SYSTOLIC AND DIASTOLIC CONGESTIVE HEART FAILURE (HCC): ICD-10-CM

## 2020-08-05 DIAGNOSIS — I11.0 HYPERTENSIVE HEART DISEASE WITH CHRONIC COMBINED SYSTOLIC AND DIASTOLIC CONGESTIVE HEART FAILURE (HCC): ICD-10-CM

## 2020-08-05 DIAGNOSIS — L08.9 PRESSURE INJURY, UNSTAGEABLE, WITH INFECTION (HCC): Primary | ICD-10-CM

## 2020-08-05 DIAGNOSIS — L03.90 CELLULITIS, UNSPECIFIED CELLULITIS SITE: ICD-10-CM

## 2020-08-05 DIAGNOSIS — I50.42 HYPERTENSIVE HEART DISEASE WITH CHRONIC COMBINED SYSTOLIC AND DIASTOLIC CONGESTIVE HEART FAILURE (HCC): ICD-10-CM

## 2020-08-05 DIAGNOSIS — I25.10 ATHEROSCLEROSIS OF NATIVE CORONARY ARTERY OF NATIVE HEART WITHOUT ANGINA PECTORIS: ICD-10-CM

## 2020-08-05 DIAGNOSIS — R53.81 DEBILITY: ICD-10-CM

## 2020-08-05 DIAGNOSIS — M47.27 LUMBOSACRAL SPONDYLOSIS WITH RADICULOPATHY: ICD-10-CM

## 2020-08-05 DIAGNOSIS — I48.0 PAROXYSMAL ATRIAL FIBRILLATION (HCC): ICD-10-CM

## 2020-08-05 PROBLEM — Z01.84 COVID-19 VIRUS ANTIBODY NEGATIVE: Status: ACTIVE | Noted: 2020-04-26

## 2020-08-05 PROCEDURE — 3008F BODY MASS INDEX DOCD: CPT | Performed by: FAMILY MEDICINE

## 2020-08-05 PROCEDURE — 1036F TOBACCO NON-USER: CPT | Performed by: FAMILY MEDICINE

## 2020-08-05 PROCEDURE — 99215 OFFICE O/P EST HI 40 MIN: CPT | Performed by: FAMILY MEDICINE

## 2020-08-05 RX ORDER — DOCUSATE SODIUM 100 MG/1
100 CAPSULE, LIQUID FILLED ORAL 2 TIMES DAILY
COMMUNITY
Start: 2020-05-21 | End: 2021-05-21

## 2020-08-05 RX ORDER — GUAIFENESIN 600 MG
600 TABLET, EXTENDED RELEASE 12 HR ORAL 2 TIMES DAILY
COMMUNITY
Start: 2020-07-21 | End: 2020-08-31 | Stop reason: ALTCHOICE

## 2020-08-05 RX ORDER — ASCORBIC ACID 500 MG
1 TABLET ORAL
COMMUNITY
Start: 2020-07-21

## 2020-08-05 RX ORDER — BALSAM PERU/CASTOR OIL
OINTMENT (GRAM) TOPICAL 3 TIMES DAILY
COMMUNITY
Start: 2020-07-21

## 2020-08-05 RX ORDER — NITROGLYCERIN 0.4 MG/1
TABLET SUBLINGUAL
COMMUNITY
Start: 2020-05-21

## 2020-08-05 RX ORDER — BENZONATATE 100 MG/1
CAPSULE ORAL
COMMUNITY
Start: 2020-07-21

## 2020-08-05 RX ORDER — SENNA AND DOCUSATE SODIUM 50; 8.6 MG/1; MG/1
2 TABLET, FILM COATED ORAL 2 TIMES DAILY PRN
COMMUNITY
Start: 2020-05-21 | End: 2021-05-21

## 2020-08-05 RX ORDER — CETIRIZINE HYDROCHLORIDE 5 MG/1
5 TABLET ORAL DAILY
COMMUNITY
Start: 2020-07-21 | End: 2020-08-20

## 2020-08-05 RX ORDER — RANOLAZINE 500 MG/1
TABLET, EXTENDED RELEASE ORAL
COMMUNITY
Start: 2020-05-22

## 2020-08-05 RX ORDER — OXYCODONE HYDROCHLORIDE 5 MG/1
5 TABLET ORAL EVERY 8 HOURS
Qty: 20 TABLET | Refills: 0 | Status: SHIPPED | OUTPATIENT
Start: 2020-08-05 | End: 2020-08-31 | Stop reason: SDUPTHER

## 2020-08-05 RX ORDER — LANOLIN ALCOHOL/MO/W.PET/CERES
3 CREAM (GRAM) TOPICAL
COMMUNITY
Start: 2020-05-21 | End: 2021-05-21

## 2020-08-05 RX ORDER — CEPHALEXIN 250 MG/1
250 CAPSULE ORAL EVERY 12 HOURS SCHEDULED
Qty: 20 CAPSULE | Refills: 0 | Status: SHIPPED | OUTPATIENT
Start: 2020-08-05 | End: 2020-08-15

## 2020-08-05 NOTE — ASSESSMENT & PLAN NOTE
Wt Readings from Last 3 Encounters:   08/05/20 70 8 kg (156 lb)   03/27/20 94 kg (207 lb 3 2 oz)   03/06/20 92 7 kg (204 lb 6 4 oz)     Blood pressure is stable at the moment  It is slightly low, but followed by Nephrology

## 2020-08-05 NOTE — ASSESSMENT & PLAN NOTE
Patient has now the pressure ulcers on anterior shins bilaterally  [facility] mentioned there was some fat layer exposed  At this point, he does need to see wound management, as well as vascular surgery for further evaluation  He does go to wound management at [facility], so should continue there  Patient mentioned that his pain is quite significant, with regard to the ulcers and skin changes  He has been on Oxycodone before, and requests same

## 2020-08-05 NOTE — PROGRESS NOTES
Assessment and Plan:    Problem List Items Addressed This Visit     Anticoagulant long-term use     Patient on Eliquis for AFib  Continue  Relevant Orders    Ambulatory referral to Cardiology    Atrial fibrillation [facility])     Currently on anticoagulation  The should continue  Will continue to follow  Patient is on Eliquis  Relevant Medications    nitroglycerin (NITROSTAT) 0 4 mg SL tablet    ranolazine (RANEXA) 500 mg 12 hr tablet    Other Relevant Orders    Ambulatory referral to Cardiology    Cellulitis     Significant issues with skin in all extremities  Appears to be possible superinfection at this time  I would recommend abx and follow with wound center stat  Will use Keflex, but at significantly reduced dose of 250 mg capsules 1 every 12 hours for 10 days  This because of his kidney disease, the fact that he is currently on dialysis  May need to adjust later on  Relevant Medications    cephalexin (KEFLEX) 250 mg capsule    Congestive heart failure (HCC)     Wt Readings from Last 3 Encounters:   08/05/20 70 8 kg (156 lb)   03/27/20 94 kg (207 lb 3 2 oz)   03/06/20 92 7 kg (204 lb 6 4 oz)   Patient does have a significant issue with regard to CHF  Weight today is quite a bit lower than what it was previously  Patient will need to follow with Cardiology as soon as possible  I did hear crackles in his lungs, which also may be an indication of fluid overload  He did have dialysis yesterday as well  Patient has not been to Cardiology recently  This is critically important for him at this point  Will refer back to Cardiology at [facility], as that is where he was before  Of note, it is likely that the patient's cough and shortness of breath were related to heart failure, not asthma  He has not been seen for asthma in quite some time, currently has still some shortness of breath, and crackles in the lungs           Relevant Medications    nitroglycerin (NITROSTAT) 0 4 mg SL tablet    ranolazine (RANEXA) 500 mg 12 hr tablet    Other Relevant Orders    Ambulatory referral to Cardiology    Coronary atherosclerosis     Follow with Cardiology  Stable at the moment, though we is quite fatigued from before  Relevant Medications    nitroglycerin (NITROSTAT) 0 4 mg SL tablet    ranolazine (RANEXA) 500 mg 12 hr tablet    Other Relevant Orders    Ambulatory referral to Cardiology    Debility     Patient does have significant issues with regard to his debility  Recommend to exercise as much as possible, consider therapy  Wound center to follow, as well as Nephrology  ESRD (end stage renal disease) on dialysis [facility])     Follow with Nephrology  Hypertensive heart disease with congestive heart failure (HCC)     Wt Readings from Last 3 Encounters:   08/05/20 70 8 kg (156 lb)   03/27/20 94 kg (207 lb 3 2 oz)   03/06/20 92 7 kg (204 lb 6 4 oz)     Blood pressure is stable at the moment  It is slightly low, but followed by Nephrology  Relevant Medications    nitroglycerin (NITROSTAT) 0 4 mg SL tablet    ranolazine (RANEXA) 500 mg 12 hr tablet    Lumbosacral spondylosis with radiculopathy     Patient does have some back pain at times, but it is not as bad as what it was  He did mention that he had oxycodone from the hospital, and on PDMP review he did get 24 on discharge  Reviewed with him that the chronic pain that he has needs to be evaluated differently with this  The pain that he is now talking about is related to the skin  He does understand that this medication is as needed, and is not to be used all the time  Also, I do feel that the cellulitis is likely the cause of most of his discomfort, and therefore needs to be treated appropriately for that  This is not just a case of giving him pain medications for his low back, and leading ago at that             Relevant Medications    oxyCODONE (ROXICODONE) 5 mg immediate release tablet    Pressure injury, unstageable, with infection ([facility] ) - Primary     Patient has now the pressure ulcers on anterior shins bilaterally  [facility] mentioned there was some fat layer exposed  At this point, he does need to see wound management, as well as vascular surgery for further evaluation  He does go to wound management at [facility], so should continue there  Patient mentioned that his pain is quite significant, with regard to the ulcers and skin changes  He has been on Oxycodone before, and requests same  Relevant Medications    Balsam [place]-Castor Oil OINT    cephalexin (KEFLEX) 250 mg capsule    Other Relevant Orders    Ambulatory referral to Wound Care                 Diagnoses and all orders for this visit:    Pressure injury, unstageable, with infection ([facility] )  -     Ambulatory referral to Wound Care; Future  -     cephalexin (KEFLEX) 250 mg capsule; Take 1 capsule (250 mg total) by mouth every 12 (twelve) hours for 10 days    Chronic combined systolic and diastolic congestive heart failure ([facility] )  -     Ambulatory referral to Cardiology; Future    Paroxysmal atrial fibrillation ([place] [address] )  -     Ambulatory referral to Cardiology; Future    Anticoagulant long-term use  -     Ambulatory referral to Cardiology; Future    ESRD (end stage renal disease) on dialysis [facility])    Atherosclerosis of native coronary artery of native heart without angina pectoris  -     Ambulatory referral to Cardiology; Future    Debility    Hypertensive heart disease with chronic combined systolic and diastolic congestive heart failure (HCC)    Cellulitis, unspecified cellulitis site  -     cephalexin (KEFLEX) 250 mg capsule; Take 1 capsule (250 mg total) by mouth every 12 (twelve) hours for 10 days    Lumbosacral spondylosis with radiculopathy  -     oxyCODONE (ROXICODONE) 5 mg immediate release tablet;  Take 1 tablet (5 mg total) by mouth every 8 (eight) hoursMax Daily Amount: 15 mg    Other orders  -     ascorbic acid (VITAMIN C) 500 MG tablet; Take 1 capsule by mouth  -     Balsam [place]-Castor Oil OINT; Apply topically Three times a day  -     benzonatate (TESSALON PERLES) 100 mg capsule  -     docusate sodium (COLACE) 100 mg capsule; Take 100 mg by mouth 2 (two) times a day  -     cetirizine (ZyrTEC) 5 MG tablet; Take 5 mg by mouth daily  -     guaiFENesin (MUCINEX) 600 mg 12 hr tablet; Take 600 mg by mouth 2 (two) times a day  -     melatonin 3 mg; Take 3 mg by mouth  -     nitroglycerin (NITROSTAT) 0 4 mg SL tablet  -     senna-docusate sodium (SENOKOT-S) 8 6-50 mg per tablet; Take 2 tablets by mouth 2 (two) times a day as needed  -     ranolazine (RANEXA) 500 mg 12 hr tablet              Subjective:      Patient ID: Emelyn Mann is a 72 y o  male  CC:    Chief Complaint   Patient presents with    Follow-up     Patient present today for a follow up after beign seen at the ER due to back pain/infection  Patient will aslo like dr Yokasta Friedman to look at his skin condition since it is causing him a lot of pain  HPI:    Patient has recently been hospital several times  Patient was admitted from the 18th of July till the 21st   This was at [address] [place] [address]  He was having increasing problems with back pain  Back pain was significant for him  He was seen in hospital for this by pain management  Wound care: He was to see wound center  He was seeing them at [facility]  He did mention that his weight was too high, and now is better  CHF:He was in fluid overload, and had dialysis in hospital   He does not recall who his cardio was  ESRD: He is on dialysis TIW, but was QIW  Reports that his breathing is not as bad  He has had some issues with SOB and cough  Legs: Significant wounds on LE, anterior, dressed today  Skin at arms and legs: Has multiple open areas, and some pain with this  He states that the legs and arms are better than when admitted  Any touch on the legs causes pain for him            The following portions of the patient's history were reviewed and updated as appropriate: allergies, current medications, past family history, past medical history, past social history, past surgical history and problem list       Review of Systems   Constitutional: Positive for activity change and fatigue  HENT: Negative  Eyes: Negative  Respiratory: Positive for cough and shortness of breath  Negative for wheezing  Gastrointestinal: Negative  Endocrine: Negative  Genitourinary: Negative  Musculoskeletal: Positive for gait problem (Problems with walking secondary to weakness  )  Skin: Positive for rash  Allergic/Immunologic: Negative  Hematological: Negative  Psychiatric/Behavioral: Negative  Data to review:   Reviewed laboratory studies from his admission at [facility] via Care everywhere  CBC showed normal white count  Objective:    Vitals:    08/05/20 0930   BP: 104/62   BP Location: Left arm   Patient Position: Sitting   Cuff Size: Large   Pulse: 80   Resp: (!) 24   Temp: 97 8 °F (36 6 °C)   TempSrc: Temporal   Weight: 70 8 kg (156 lb)   Height: 5' 8"        Physical Exam   Constitutional: He is oriented to person, place, and time  He appears well-developed  No distress  HENT:   Head: Normocephalic and atraumatic  Right Ear: Hearing, tympanic membrane, external ear and ear canal normal    Left Ear: Hearing, tympanic membrane, external ear and ear canal normal    Nose: Nose normal  Right sinus exhibits no maxillary sinus tenderness and no frontal sinus tenderness  Left sinus exhibits no maxillary sinus tenderness and no frontal sinus tenderness  Mouth/Throat: Uvula is midline  No oropharyngeal exudate  Eyes: Pupils are equal, round, and reactive to light  Conjunctivae are normal  Lids are everted and swept, no foreign bodies found  Neck: Normal range of motion  Neck supple  Carotid bruit is not present  No tracheal deviation present  No thyromegaly present     Cardiovascular: Normal rate, regular rhythm and normal heart sounds  Exam reveals no gallop and no friction rub  No murmur heard  Pulses:       Carotid pulses are 2+ on the right side and 2+ on the left side  Pulmonary/Chest: He is in respiratory distress  Expiration is prolonged  He has no wheezes  He has rales  Abdominal: Soft  Bowel sounds are normal  He exhibits no distension  There is no abdominal tenderness  Musculoskeletal:      Right lower le+ Edema present  Left lower le+ Edema present  Lymphadenopathy:     He has no cervical adenopathy  Neurological: He is alert and oriented to person, place, and time  Coordination normal    Skin: Skin is warm and dry  He is not diaphoretic  Significant amount of areas with scaling, irritation, scabbing  Some redness along with this  Psychiatric: His behavior is normal  Judgment and thought content normal    Nursing note and vitals reviewed

## 2020-08-05 NOTE — ASSESSMENT & PLAN NOTE
Patient does have significant issues with regard to his debility  Recommend to exercise as much as possible, consider therapy  Wound center to follow, as well as Nephrology

## 2020-08-05 NOTE — ASSESSMENT & PLAN NOTE
Patient does have some back pain at times, but it is not as bad as what it was  He did mention that he had oxycodone from the hospital, and on PDMP review he did get 24 on discharge  Reviewed with him that the chronic pain that he has needs to be evaluated differently with this  The pain that he is now talking about is related to the skin  He does understand that this medication is as needed, and is not to be used all the time  Also, I do feel that the cellulitis is likely the cause of most of his discomfort, and therefore needs to be treated appropriately for that  This is not just a case of giving him pain medications for his low back, and leading ago at that

## 2020-08-05 NOTE — ASSESSMENT & PLAN NOTE
Wt Readings from Last 3 Encounters:   08/05/20 70 8 kg (156 lb)   03/27/20 94 kg (207 lb 3 2 oz)   03/06/20 92 7 kg (204 lb 6 4 oz)   Patient does have a significant issue with regard to CHF  Weight today is quite a bit lower than what it was previously  Patient will need to follow with Cardiology as soon as possible  I did hear crackles in his lungs, which also may be an indication of fluid overload  He did have dialysis yesterday as well  Patient has not been to Cardiology recently  This is critically important for him at this point  Will refer back to Cardiology at [facility], as that is where he was before  Of note, it is likely that the patient's cough and shortness of breath were related to heart failure, not asthma  He has not been seen for asthma in quite some time, currently has still some shortness of breath, and crackles in the lungs

## 2020-08-05 NOTE — PATIENT INSTRUCTIONS
Problem List Items Addressed This Visit     Anticoagulant long-term use     Patient on Eliquis for AFib  Continue  Relevant Orders    Ambulatory referral to Cardiology    Atrial fibrillation [facility])     Currently on anticoagulation  The should continue  Will continue to follow  Patient is on Eliquis  Relevant Medications    nitroglycerin (NITROSTAT) 0 4 mg SL tablet    ranolazine (RANEXA) 500 mg 12 hr tablet    Other Relevant Orders    Ambulatory referral to Cardiology    Cellulitis     Significant issues with skin in all extremities  Appears to be possible superinfection at this time  I would recommend abx and follow with wound center stat  Will use Keflex, but at significantly reduced dose of 250 mg capsules 1 every 12 hours for 10 days  This because of his kidney disease, the fact that he is currently on dialysis  May need to adjust later on  Relevant Medications    cephalexin (KEFLEX) 250 mg capsule    Congestive heart failure (HCC)     Wt Readings from Last 3 Encounters:   08/05/20 70 8 kg (156 lb)   03/27/20 94 kg (207 lb 3 2 oz)   03/06/20 92 7 kg (204 lb 6 4 oz)   Patient does have a significant issue with regard to CHF  Weight today is quite a bit lower than what it was previously  Patient will need to follow with Cardiology as soon as possible  I did hear crackles in his lungs, which also may be an indication of fluid overload  He did have dialysis yesterday as well  Patient has not been to Cardiology recently  This is critically important for him at this point  Will refer back to Cardiology at [facility], as that is where he was before  Of note, it is likely that the patient's cough and shortness of breath were related to heart failure, not asthma  He has not been seen for asthma in quite some time, currently has still some shortness of breath, and crackles in the lungs           Relevant Medications    nitroglycerin (NITROSTAT) 0 4 mg SL tablet    ranolazine (RANEXA) 500 mg 12 hr tablet    Other Relevant Orders    Ambulatory referral to Cardiology    Coronary atherosclerosis     Follow with Cardiology  Stable at the moment, though we is quite fatigued from before  Relevant Medications    nitroglycerin (NITROSTAT) 0 4 mg SL tablet    ranolazine (RANEXA) 500 mg 12 hr tablet    Other Relevant Orders    Ambulatory referral to Cardiology    Debility     Patient does have significant issues with regard to his debility  Recommend to exercise as much as possible, consider therapy  Wound center to follow, as well as Nephrology  ESRD (end stage renal disease) on dialysis [facility])     Follow with Nephrology  Hypertensive heart disease with congestive heart failure (HCC)     Wt Readings from Last 3 Encounters:   08/05/20 70 8 kg (156 lb)   03/27/20 94 kg (207 lb 3 2 oz)   03/06/20 92 7 kg (204 lb 6 4 oz)     Blood pressure is stable at the moment  It is slightly low, but followed by Nephrology  Relevant Medications    nitroglycerin (NITROSTAT) 0 4 mg SL tablet    ranolazine (RANEXA) 500 mg 12 hr tablet    Lumbosacral spondylosis with radiculopathy     Patient does have some back pain at times, but it is not as bad as what it was  He did mention that he had oxycodone from the hospital, and on PDMP review he did get 24 on discharge  Reviewed with him that the chronic pain that he has needs to be evaluated differently with this  The pain that he is now talking about is related to the skin  He does understand that this medication is as needed, and is not to be used all the time  Also, I do feel that the cellulitis is likely the cause of most of his discomfort, and therefore needs to be treated appropriately for that  This is not just a case of giving him pain medications for his low back, and leading ago at that             Relevant Medications    oxyCODONE (ROXICODONE) 5 mg immediate release tablet    Pressure injury, unstageable, with infection [facility]) - Primary     Patient has now the pressure ulcers on anterior shins bilaterally  [facility] mentioned there was some fat layer exposed  At this point, he does need to see wound management, as well as vascular surgery for further evaluation  He does go to wound management at [facility], so should continue there  Patient mentioned that his pain is quite significant, with regard to the ulcers and skin changes  He has been on Oxycodone before, and requests same  Relevant Medications    Balsam [place]-Castor Oil OINT    cephalexin (KEFLEX) 250 mg capsule    Other Relevant Orders    Ambulatory referral to [facility] Instructions    [address] was advised to limit contact with others to essential tasks such as getting food, medications, and medical care  Proper handwashing reviewed, and Hand sanitzer when washing is not available  If the patient develops symptoms of COVID 19, the patient should call the office as soon as possible  Please try to download Google Duo  Once you do download this on your phone, you will be prompted to add your phone number to the account  After that, he should receive a text from [employer], and use that code to verify your phone number  After that, you should be able to use Google Duo to receive and make video calls  Please download Microsoft Teams to your phone or computer  We will be transitioning to this platform for Video Visits  Instructions for downloading this are available from the office

## 2020-08-05 NOTE — ASSESSMENT & PLAN NOTE
Significant issues with skin in all extremities  Appears to be possible superinfection at this time  I would recommend abx and follow with wound center stat  Will use Keflex, but at significantly reduced dose of 250 mg capsules 1 every 12 hours for 10 days  This because of his kidney disease, the fact that he is currently on dialysis  May need to adjust later on

## 2020-08-07 DIAGNOSIS — J45.909 UNCOMPLICATED ASTHMA, UNSPECIFIED ASTHMA SEVERITY, UNSPECIFIED WHETHER PERSISTENT: Primary | ICD-10-CM

## 2020-08-07 RX ORDER — GUAIFENESIN 600 MG/1
TABLET, EXTENDED RELEASE ORAL
Qty: 30 TABLET | Refills: 2 | OUTPATIENT
Start: 2020-08-07

## 2020-08-07 RX ORDER — CETIRIZINE HYDROCHLORIDE 5 MG/1
TABLET ORAL
Qty: 30 TABLET | Refills: 2 | OUTPATIENT
Start: 2020-08-07

## 2020-08-07 RX ORDER — BENZONATATE 100 MG/1
CAPSULE ORAL
Qty: 30 CAPSULE | Refills: 2 | OUTPATIENT
Start: 2020-08-07

## 2020-08-07 RX ORDER — NEBULIZER AND COMPRESSOR
EACH MISCELLANEOUS
Qty: 1 EACH | Refills: 0 | Status: SHIPPED | OUTPATIENT
Start: 2020-08-07

## 2020-08-07 RX ORDER — ALBUTEROL SULFATE 2.5 MG/3ML
SOLUTION RESPIRATORY (INHALATION)
Qty: 540 ML | Refills: 0 | Status: SHIPPED | OUTPATIENT
Start: 2020-08-07

## 2020-08-10 RX ORDER — GUAIFENESIN 600 MG/1
TABLET, EXTENDED RELEASE ORAL
Qty: 30 TABLET | Refills: 5 | OUTPATIENT
Start: 2020-08-10

## 2020-08-10 RX ORDER — MIDODRINE HYDROCHLORIDE 5 MG/1
TABLET ORAL
Qty: 270 TABLET | Refills: 5 | OUTPATIENT
Start: 2020-08-10

## 2020-08-10 RX ORDER — CETIRIZINE HYDROCHLORIDE 5 MG/1
TABLET ORAL
Qty: 30 TABLET | Refills: 5 | OUTPATIENT
Start: 2020-08-10

## 2020-08-10 RX ORDER — SEVELAMER CARBONATE 800 MG/1
TABLET, FILM COATED ORAL
Qty: 540 TABLET | Refills: 5 | OUTPATIENT
Start: 2020-08-10

## 2020-08-10 RX ORDER — BENZONATATE 100 MG/1
CAPSULE ORAL
Qty: 30 CAPSULE | Refills: 5 | OUTPATIENT
Start: 2020-08-10

## 2020-08-13 RX ORDER — SEVELAMER CARBONATE 800 MG/1
TABLET, FILM COATED ORAL
Qty: 540 TABLET | Refills: 2 | OUTPATIENT
Start: 2020-08-13

## 2020-08-13 RX ORDER — MIDODRINE HYDROCHLORIDE 5 MG/1
TABLET ORAL
Qty: 270 TABLET | Refills: 2 | OUTPATIENT
Start: 2020-08-13

## 2020-08-13 NOTE — TELEPHONE ENCOUNTER
These 2 prescriptions have been refused several times from this office, they are for Nephrology  I will not renew these as they are Nephrology medications, and renal should be taking care of this

## 2020-08-24 RX ORDER — GUAIFENESIN 100 MG/5ML
SYRUP ORAL
Qty: 946 ML | Refills: 5 | OUTPATIENT
Start: 2020-08-24

## 2020-08-24 NOTE — TELEPHONE ENCOUNTER
Prescription was sent for Mucinex  This prescription (guaifenesin 100 mg per 5 mL) is over-the-counter

## 2020-08-28 RX ORDER — PANTOPRAZOLE SODIUM 20 MG/1
TABLET, DELAYED RELEASE ORAL
Qty: 90 TABLET | Refills: 2 | OUTPATIENT
Start: 2020-08-28

## 2020-08-28 NOTE — TELEPHONE ENCOUNTER
Patient was on pantoprazole before, but I cannot see where he had a refill of this since 2019  Based on this, I would not recommend starting new medications, especially with his chronic kidney disease

## 2020-08-31 ENCOUNTER — OFFICE VISIT (OUTPATIENT)
Dept: FAMILY MEDICINE CLINIC | Facility: CLINIC | Age: 65
End: 2020-08-31
Payer: MEDICARE

## 2020-08-31 VITALS
BODY MASS INDEX: 30.16 KG/M2 | TEMPERATURE: 97.3 F | SYSTOLIC BLOOD PRESSURE: 120 MMHG | WEIGHT: 199 LBS | HEIGHT: 68 IN | HEART RATE: 82 BPM | DIASTOLIC BLOOD PRESSURE: 68 MMHG

## 2020-08-31 DIAGNOSIS — I11.0 HYPERTENSIVE HEART DISEASE WITH CHRONIC COMBINED SYSTOLIC AND DIASTOLIC CONGESTIVE HEART FAILURE (HCC): ICD-10-CM

## 2020-08-31 DIAGNOSIS — I50.42 CHRONIC COMBINED SYSTOLIC AND DIASTOLIC CONGESTIVE HEART FAILURE (HCC): ICD-10-CM

## 2020-08-31 DIAGNOSIS — L97.911 ULCER OF RIGHT LOWER EXTREMITY, LIMITED TO BREAKDOWN OF SKIN (HCC): ICD-10-CM

## 2020-08-31 DIAGNOSIS — M47.27 LUMBOSACRAL SPONDYLOSIS WITH RADICULOPATHY: ICD-10-CM

## 2020-08-31 DIAGNOSIS — I50.42 HYPERTENSIVE HEART DISEASE WITH CHRONIC COMBINED SYSTOLIC AND DIASTOLIC CONGESTIVE HEART FAILURE (HCC): ICD-10-CM

## 2020-08-31 DIAGNOSIS — N18.6 ESRD (END STAGE RENAL DISEASE) ON DIALYSIS (HCC): ICD-10-CM

## 2020-08-31 DIAGNOSIS — Z99.2 ESRD (END STAGE RENAL DISEASE) ON DIALYSIS (HCC): ICD-10-CM

## 2020-08-31 DIAGNOSIS — K31.811 GASTROINTESTINAL HEMORRHAGE ASSOCIATED WITH ANGIODYSPLASIA OF STOMACH AND DUODENUM: Primary | ICD-10-CM

## 2020-08-31 DIAGNOSIS — L89.95 PRESSURE INJURY, UNSTAGEABLE, WITH INFECTION (HCC): ICD-10-CM

## 2020-08-31 DIAGNOSIS — D63.8 ANEMIA OF CHRONIC DISEASE: ICD-10-CM

## 2020-08-31 DIAGNOSIS — L08.9 PRESSURE INJURY, UNSTAGEABLE, WITH INFECTION (HCC): ICD-10-CM

## 2020-08-31 DIAGNOSIS — J30.9 ALLERGIC RHINITIS, UNSPECIFIED SEASONALITY, UNSPECIFIED TRIGGER: ICD-10-CM

## 2020-08-31 DIAGNOSIS — I48.0 PAROXYSMAL ATRIAL FIBRILLATION (HCC): ICD-10-CM

## 2020-08-31 DIAGNOSIS — R26.2 AMBULATORY DYSFUNCTION: ICD-10-CM

## 2020-08-31 PROCEDURE — 4004F PT TOBACCO SCREEN RCVD TLK: CPT | Performed by: FAMILY MEDICINE

## 2020-08-31 PROCEDURE — 99215 OFFICE O/P EST HI 40 MIN: CPT | Performed by: FAMILY MEDICINE

## 2020-08-31 PROCEDURE — 3008F BODY MASS INDEX DOCD: CPT | Performed by: FAMILY MEDICINE

## 2020-08-31 RX ORDER — FLUTICASONE PROPIONATE 50 MCG
2 SPRAY, SUSPENSION (ML) NASAL DAILY
Qty: 1 BOTTLE | Refills: 5 | Status: SHIPPED | OUTPATIENT
Start: 2020-08-31

## 2020-08-31 RX ORDER — GUAIFENESIN 600 MG
600 TABLET, EXTENDED RELEASE 12 HR ORAL 2 TIMES DAILY
Qty: 60 TABLET | Refills: 11 | Status: CANCELLED | OUTPATIENT
Start: 2020-08-31 | End: 2021-08-31

## 2020-08-31 RX ORDER — PANTOPRAZOLE SODIUM 40 MG/1
40 TABLET, DELAYED RELEASE ORAL DAILY
Qty: 30 TABLET | Refills: 11 | Status: CANCELLED | OUTPATIENT
Start: 2020-08-31 | End: 2021-08-31

## 2020-08-31 RX ORDER — OXYCODONE HYDROCHLORIDE 5 MG/1
5 TABLET ORAL EVERY 8 HOURS
Qty: 20 TABLET | Refills: 0 | Status: SHIPPED | OUTPATIENT
Start: 2020-08-31

## 2020-08-31 NOTE — ASSESSMENT & PLAN NOTE
Wt Readings from Last 3 Encounters:   08/31/20 90 3 kg (199 lb)   08/05/20 70 8 kg (156 lb)   03/27/20 94 kg (207 lb 3 2 oz)     Blood pressure today is quite good  No changes  If he does need adjustment, should come from cardiology or nephrology

## 2020-08-31 NOTE — ASSESSMENT & PLAN NOTE
Patient does have a history of allergic rhinitis  He reports he has a fair amount of postnasal drip, leading to irritation and needing to clear his throat frequently  We have not prescribed the Mucinex from previously from this office, and at this point, would prefer that he try Flonase as it has limited systemic effects  Patient does have hydroxyzine listed, but he does not recall taking it  Will follow in the future

## 2020-08-31 NOTE — ASSESSMENT & PLAN NOTE
Patient did have evaluation previously with GI  They recommended 3 months of pantoprazole in 2018  Follow-up should have been after that for possibly discussing EGD verses colonoscopy versus both  At this time, I reviewed his most recent CBC, it was normal   He reports he is not having any reflux symptoms  Also not noting any blood in the stool  Based on this, would not recommend continuing on pantoprazole  Should stop using at this point, and follow with Gastroenterology that was recommended previously  We try to limit as many medications as possible given his end-stage renal disease

## 2020-08-31 NOTE — LETTER
August 31, 2020     Dasha Gold MD  [address]    Patient: Truman Cali   YOB: 1955   Date of Visit: 8/31/2020       Dear Dr Ferguson Manual: Thank you for referring Ja Alvarez to me for evaluation  Below are my notes for this consultation  Please note:  Patient is having significant amount of discomfort related to his wounds  This should be taken care of by Wound Management, not primary care  Pain is directly as a result of the wounds  In the past, patient asked this office for medication, but needed wound evaluation due to infection  If you have questions, please do not hesitate to call me  I look forward to following your patient along with you  Sincerely,        Makeda Crawford MD        CC: No Recipients  Makeda Crawford MD  8/31/2020 10:56 AM  Sign when Signing Visit  Assessment and Plan:    Problem List Items Addressed This Visit     Allergic rhinitis     Patient does have a history of allergic rhinitis  He reports he has a fair amount of postnasal drip, leading to irritation and needing to clear his throat frequently  We have not prescribed the Mucinex from previously from this office, and at this point, would prefer that he try Flonase as it has limited systemic effects  Patient does have hydroxyzine listed, but he does not recall taking it  Will follow in the future  Relevant Medications    fluticasone (FLONASE) 50 mcg/act nasal spray    Ambulatory dysfunction     Currently using walker  No change  Anemia of chronic disease     Last blood work that I see available does not show significant anemia  He seems to be doing relatively well with regard to hemoglobin and hematocrit  Follow-up in the future as scheduled  Relevant Orders    Ambulatory referral to Gastroenterology    Atrial fibrillation ([address] )     Stable for the moment  Follow with Cardiology  Continue on Eliquis           Congestive heart failure ([facility] )     Wt Readings from Last 3 Encounters:   08/31/20 90 3 kg (199 lb)   08/05/20 70 8 kg (156 lb)   03/27/20 94 kg (207 lb 3 2 oz)     Stable  Weight today is certainly elevated verses his last visit  His last visit was likely abnormal, as he is down from March from 207-199  Continue to follow with Cardiology  Continue to follow with Nephrology  Patient does report that he continues to have a significant amount of shortness of breath with any activity  Has been for a while now  Decreased exercise tolerance as well  Needs to follow with Cardiology for this  ESRD (end stage renal disease) on dialysis ([facility] )     Stable on dialysis  Follow with Nephrology  GI bleeding - Primary     Patient did have evaluation previously with GI  They recommended 3 months of pantoprazole in 2018  Follow-up should have been after that for possibly discussing EGD verses colonoscopy versus both  At this time, I reviewed his most recent CBC, it was normal   He reports he is not having any reflux symptoms  Also not noting any blood in the stool  Based on this, would not recommend continuing on pantoprazole  Should stop using at this point, and follow with Gastroenterology that was recommended previously  We try to limit as many medications as possible given his end-stage renal disease  Relevant Orders    Ambulatory referral to Gastroenterology    Hypertensive heart disease with congestive heart failure ([place] [address] )     Wt Readings from Last 3 Encounters:   08/31/20 90 3 kg (199 lb)   08/05/20 70 8 kg (156 lb)   03/27/20 94 kg (207 lb 3 2 oz)     Blood pressure today is quite good  No changes  If he does need adjustment, should come from cardiology or nephrology               Lumbosacral spondylosis with radiculopathy    Relevant Medications    oxyCODONE (ROXICODONE) 5 mg immediate release tablet    Pressure injury, unstageable, with infection ([facility] )     Patient does have some irritation in the lower extremities, listed as full-thickness skin ulceration by [address] recently  Patient reports that the pain with these wounds is quite significant  Unfortunately, wound management has not decided to take care of his pain level with that, and has preferred to say that primary care should take care of it  Oxycodone will be sent to the pharmacy today for a small amount  I am quite concerned about use of oxycodone for this patient has previously he has tried to have medications refilled when he had infections  This would be quite concerning as far as follow-up  Patient needs to keep follow-up with Wound Center as well  I do feel that the wound center should be taking care of this, i e  It is their responsibility take care of the pain medications for this patient with ulcers and wound care  Ulcer of right lower extremity, limited to breakdown of skin ([facility] )     Please see discussion of pressure ulcers  Diagnoses and all orders for this visit:    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  -     Ambulatory referral to Gastroenterology; Future    Lumbosacral spondylosis with radiculopathy  -     oxyCODONE (ROXICODONE) 5 mg immediate release tablet; Take 1 tablet (5 mg total) by mouth every 8 (eight) hoursMax Daily Amount: 15 mg    Anemia of chronic disease  -     Ambulatory referral to Gastroenterology;  Future    Paroxysmal atrial fibrillation (HCC)    Chronic combined systolic and diastolic congestive heart failure (HCC)    ESRD (end stage renal disease) on dialysis ([facility] )    Hypertensive heart disease with chronic combined systolic and diastolic congestive heart failure (HCC)    Allergic rhinitis, unspecified seasonality, unspecified trigger  -     fluticasone (FLONASE) 50 mcg/act nasal spray; 2 sprays into each nostril daily    Ambulatory dysfunction    Pressure injury, unstageable, with infection ([facility] )    Ulcer of right lower extremity, limited to breakdown of skin ([facility] )    Other orders  -     Cancel: guaiFENesin (MUCINEX) 600 mg 12 hr tablet; Take 1 tablet (600 mg total) by mouth 2 (two) times a day  -     Cancel: pantoprazole (PROTONIX) 40 mg tablet; Take 1 tablet (40 mg total) by mouth daily              Subjective:      Patient ID: Kartik Vazquez is a 72 y o  male  CC:    Chief Complaint   Patient presents with    Follow-up     3 week recheck both legs pressure sores  mgb       HPI:    Patient reports he still has wounds open on legs  They drain some brown fluid and some red  Red is center  He is changing dressings 3 times a week ([facility])  Has been to the wound center at [facility]  He reports that the wound center has told him to contact PCP for pain control with regard to wounds  He has had oxycodne from this office before, but he has requested oxycodone before in place of going to the hospital with wound infection  Still has open areas on foot, and irritation at knees and wrist   Photos taken today  ESRD: Dialysis 4 day a week  Sees renal with this  He has cramps at times  CHF: He has been OK with this  Seeing Cardiology in future  HTN: Has been OK  Has meds adjusted by renal     Amb dysfunction: Using cane before, but now using walker  Feels this is better  Patient does have a significant amount of postnasal drip  Because of this, he does have irritation in the throat  He feels he has to clear his throat quite frequently  He has been on Mucinex for this in the past   Seems to have helped somewhat  He has multiple fractured teeth  He is going to get to dental in the future  The following portions of the patient's history were reviewed and updated as appropriate: allergies, current medications, past family history, past medical history, past social history, past surgical history and problem list       Review of Systems   Constitutional: Negative  HENT: Negative  Eyes: Negative      Respiratory: Negative  Cardiovascular: Negative  Gastrointestinal: Negative  Endocrine: Negative  Genitourinary: Negative  Musculoskeletal: Negative  Skin: Positive for wound  Allergic/Immunologic: Negative  Neurological: Negative  Hematological: Negative  Psychiatric/Behavioral: Negative  Data to review:       Objective:    Vitals:    08/31/20 1001   BP: 120/68   BP Location: Left arm   Patient Position: Sitting   Cuff Size: Large   Pulse: 82   Temp: (!) 97 3 °F (36 3 °C)   TempSrc: Temporal   Weight: 90 3 kg (199 lb)   Height: 5' 8" (1 727 m)        Physical Exam  Vitals signs and nursing note reviewed  Constitutional:       Appearance: Normal appearance  Cardiovascular:      Rate and Rhythm: Normal rate and regular rhythm  Pulses: Normal pulses  Carotid pulses are 2+ on the right side and 2+ on the left side  Heart sounds: Normal heart sounds  No murmur  No gallop  Pulmonary:      Effort: Pulmonary effort is normal  No respiratory distress  Breath sounds: Normal breath sounds  No stridor  No wheezing, rhonchi or rales  Chest:      Chest wall: No tenderness  Skin:     Comments: Wounds dressed on LE bilaterally  Has some scabs on knees, and wrist   Pain at the foot  Has clinical photos today  Neurological:      Mental Status: He is alert

## 2020-08-31 NOTE — PATIENT INSTRUCTIONS
Problem List Items Addressed This Visit     Allergic rhinitis     Patient does have a history of allergic rhinitis  He reports he has a fair amount of postnasal drip, leading to irritation and needing to clear his throat frequently  We have not prescribed the Mucinex from previously from this office, and at this point, would prefer that he try Flonase as it has limited systemic effects  Patient does have hydroxyzine listed, but he does not recall taking it  Will follow in the future  Relevant Medications    fluticasone (FLONASE) 50 mcg/act nasal spray    Ambulatory dysfunction     Currently using walker  No change  Anemia of chronic disease     Last blood work that I see available does not show significant anemia  He seems to be doing relatively well with regard to hemoglobin and hematocrit  Follow-up in the future as scheduled  Relevant Orders    Ambulatory referral to Gastroenterology    Atrial fibrillation ([facility] 75 )     Stable for the moment  Follow with Cardiology  Continue on Eliquis  Congestive heart failure (HCC)     Wt Readings from Last 3 Encounters:   08/31/20 90 3 kg (199 lb)   08/05/20 70 8 kg (156 lb)   03/27/20 94 kg (207 lb 3 2 oz)     Stable  Weight today is certainly elevated verses his last visit  His last visit was likely abnormal, as he is down from March from 207-199  Continue to follow with Cardiology  Continue to follow with Nephrology  Patient does report that he continues to have a significant amount of shortness of breath with any activity  Has been for a while now  Decreased exercise tolerance as well  Needs to follow with Cardiology for this  ESRD (end stage renal disease) on dialysis ([facility] [address] )     Stable on dialysis  Follow with Nephrology  GI bleeding - Primary     Patient did have evaluation previously with GI  They recommended 3 months of pantoprazole in 2018   Follow-up should have been after that for possibly discussing EGD verses colonoscopy versus both  At this time, I reviewed his most recent CBC, it was normal   He reports he is not having any reflux symptoms  Also not noting any blood in the stool  Based on this, would not recommend continuing on pantoprazole  Should stop using at this point, and follow with Gastroenterology that was recommended previously  We try to limit as many medications as possible given his end-stage renal disease  Relevant Orders    Ambulatory referral to Gastroenterology    [facility]     Wt Readings from Last 3 Encounters:   08/31/20 90 3 kg (199 lb)   08/05/20 70 8 kg (156 lb)   03/27/20 94 kg (207 lb 3 2 oz)     Blood pressure today is quite good  No changes  If he does need adjustment, should come from cardiology or nephrology  Lumbosacral spondylosis with radiculopathy    Relevant Medications    oxyCODONE (ROXICODONE) 5 mg immediate release tablet    Pressure injury, unstageable, with infection ([facility] [address] )     Patient does have some irritation in the lower extremities, listed as full-thickness skin ulceration by [address] recently  Patient reports that the pain with these wounds is quite significant  Unfortunately, wound management has not decided to take care of his pain level with that, and has preferred to say that primary care should take care of it  Oxycodone will be sent to the pharmacy today for a small amount  I am quite concerned about use of oxycodone for this patient has previously he has tried to have medications refilled when he had infections  This would be quite concerning as far as follow-up  Patient needs to keep follow-up with Wound Center as well  I do feel that the wound center should be taking care of this, i e  It is their responsibility take care of the pain medications for this patient with ulcers and wound care           Ulcer of right lower extremity, limited to breakdown of skin ([facility] [address] ) Please see discussion of pressure ulcers  COVID 19 Instructions    Ja Margret Phelps was advised to limit contact with others to essential tasks such as getting food, medications, and medical care  Proper handwashing reviewed, and Hand sanitzer when washing is not available  If the patient develops symptoms of COVID 19, the patient should call the office as soon as possible  For 4139-3232 Flu season, it is strongly recommended that Flu Vaccinations be obtained  Please try to download Google Duo  Once you do download this on your phone, you will be prompted to add your phone number to the account  After that, he should receive a text from [employer], and use that code to verify your phone number  After that, you should be able to use Google Duo to receive and make video calls  Please download Microsoft Teams to your phone or computer  We will be transitioning to this platform for Video Visits  Instructions for downloading this are available from the office  ÇáÊåÇÈ ÇáÃäÝ ÇáäÇÊÌ Úä ÇáÍÓÇÓíÉ   ÇáÑÚÇíÉ ÇáÎÇÑÌíÉ ááãÑÖì:   ÇáÊåÇÈ ÇáÃäÝ ÇáäÇÊÌ Úä ÇáÍÓÇÓíÉ ¡ Ãæ Íãì ÇáÑÈíÚ¡ åæ ÇáÊåÇÈ íÍÏË ÏÇÎá ÇáÃäÝ  æíßæä åÐÇ ÇáÊæÑã ÚÈÇÑÉ Úä ÑÏ ÝÚá áãÓÈÈÇÊ ÇáÍÓÇÓíÉ Ýí ÇáåæÇÁ  íãßä áãÓÈÈÇÊ ÇáÍÓÇÓíÉ Ãä Êßæä Ãí ÔíÁ íÓÈÈ ÍÇáÉ ÑÏ ÝÚá áæÌæÏ ÍÓÇÓíÉ  ÛÇáÈðÇ ãÇ ÊÓÈÈ ÍÇáÇÊ ÇáÍÓÇÓíÉ ÊÌÇå ÇáÃÚÔÇÈ Ãæ ÇáäÌíá Ãæ ÇáÃÔÌÇÑ Ãæ ÇáÊÚÝä ÍÏæË ÇáÊåÇÈ ÇáÃäÝ ÇáÃÑÌí ÇáãæÓãí  æÞÏ íÊÓÈÈ ÃíÖðÇ ÚË ÇáÛÈÇÑ Ãæ ÇáÕÑÇÕíÑ Ãæ æÈÑ ÇáÍíæÇäÇÊ IMCOQDJ Ãæ ÇáÚÝä Ýí ÇáÊåÇÈ ÇáÃäÝ ÇáÃÑÌí  ÊÊÖãä JJCXVJEU æÇáÃÚÑÇÖ ÇáÔÇÆÚÉ ãÇ íáí:   · ÇáÚØÓ    · ÇÍÊÞÇä ÇáÃäÝ    · ÓíáÇä ÇáÃäÝ    · ÍßÉ ÈÇáÃäÝ Ãæ ÇáÚíäíä Ãæ ÇáÝã    · ÊÏãøÚ ÇáÚíäíä TDWNLWBPH    · ÇáÊäÞíØ ÇáÃäÝí ÇáÎáÝí (ÑÔÍ ÇáÃäÝ Åáì ÏÇÎá ÇáÍáÞ)    · ÇáÓÚÇá Ãæ ÊØåíÑ ÇáÍáÞ ÇáãÊßÑÑ    · ÇáÔÚæÑ LRVPCZKV Ãæ ÇáßÓá    · ÏæÇÆÑ ãÙáãÉ ÃÓÝá ÇáÚíäíä  íÌÈ MBGTMMH ÈÑÞã 911 Ýí ÇáÍÇáÇÊ ÇáÊÇáíÉ:   · ßäÊ ÊÚÇäí ãä Ãáã Ýí ÇáÕÏÑ Ãæ ÖíÞ Ýí ÇáÊäÝÓ       íõÑÌì ØáÈ ÇáÑÚÇíÉ ÝæÑðÇ Ýí ÇáÍÇáÇÊ ÇáÊÇáíÉ:   · ÅÐÇ ßäÊ ÊÚÇäí ãä Ãáã ÍÇÏ    · ÇáÓÚÇá ZBHCGHN ÈÏã  íõÑÌì KZZJXXP ÈãÞÏã ÇáÑÚÇíÉ ÇáÕÍíÉ áÏíß Ýí [id number] ÇáÊÇáíÉ:   · ÇáÅÕÇÈÉ ÈÇáÍãì     · ßäÊ ÊÚÇäí ãä Ãáã Ýí ÇáÃÐä Ãæ ÇáÌíæÈ ÇáÃäÝíÉ Ãæ ãä ÕÏÇÚ  · ÊÊÝÇÞã ÇáÃÚÑÇÖ¡ ÍÊì ÈÚÏ ÇáÚáÇÌ  · ÊÚÇäí ãä ÅÎÑÇÌ ãÎÇØ ÃÕÝÑ Ãæ ÃÎÖÑ Ãæ Èäí Ãæ Ïãæí äÇÊÌ ãä ÃäÝß  · ÃäÝß Ýí ÍÇáÉ äÒíÝ Ãæ ÊÚÇäí ãä Ãáã ÏÇÎá ÃäÝß  · ÞÏ ÊæÇÌå ÕÚæÈÉ Ýí Çáäæã ÈÓÈÈ ÇáÃÚÑÇÖ  · ÅÐÇ ßÇäÊ áÏíß CPFYSRCPN Ãæ ãÎÇæÝ BJMUVC ÈÍÇáÊß ÇáãÑÖíøÉ Ãæ ÈÇáÑÚÇíÉ  ÇáÚáÇÌ:   · ãÖÇÏÇÊ ÇáåíÓÊÇãíä  ÊÓÇÚÏ Úáì ÊÎÝíÝ ÇáÍßÉ æÇáÚØÓ æÓíáÇä ÇáÃäÝ  ÞÏ ÊÔÚÑß ÈÚÖ ãÖÇÏÇÊ ÇáåÓÊÇãíä ÈÇáäÚÇÓ  · ÇáÓÊíÑæíÏÇÊ ÇáÃäÝíÉ  ÊÓÇÚÏ Úáì ÊÎÝíÝ ÇáÇáÊåÇÈ Ýí ÇáÃäÝ  · ãÒíáÇÊ TWYQUNUA  ÊÓÇÚÏ Úáì ÊØåíÑ ÇäÓÏÇÏ ÇáÃäÝ  · DHWIIB ÇáãäÇÚí  ÞÏ íßæä ÖÑæÑíðÇ ÅÐÇ ßÇä ÃÚÑÇÖß ÍÇÏÉ Ãæ ßÇäÊ GCFYFGPI ÇáÃÎÑì ÛíÑ ãÌÏíÉ  æíõÓÊÎÏã ÇáÚáÇÌ ÇáãäÇÚí áÍÞä ãÓÈÈ ÇáÍÓÇÓíÉ Ýí ÇáÌáÏ  æíÍÊæí ÇáÚáÇÌ Ýí ÇáÈÏÇíÉ Úáì ßãíÇÊ ÖÆíáÉ ãä ãÓÈÈ ÇáÍÓÇÓíÉ  Ëã ÓíóÒíÏ ãÞÏã ÇáÑÚÇíÉ ÇáÕÍíÉ ßãíÉ ãÓÈÈ ÇáÍÓÇÓíÉ ÒíÇÏÉ ÈØíÆÉ  æåÐÇ ÞÏ íÓÇÚÏ ÌÓãß Úáì ÊÑÇÌÚ ÍÓÇÓíÊå áãÓÈÈ ÇáÍÓÇÓíÉ ææÞÝ CYLDHIW ÖÏå  æÞÏ ÊÍÊÇÌ Åáì ÇáÚáÇÌ ÇáãäÇÚí áÃÓÇÈíÚ Ãæ ãÏÉ ÃØæá  THKLFUA ãÚ ÇáäÇÊÌ Úä ÇáÍÓÇÓíÉ:  ÃÝÖá ØÑíÞÉ ááÊÍßã Ýí ÇáÊåÇÈ ÇáÃäÝ ÇáäÇÊÌ Úä ÇáÍÓÇÓíÉ åí ÊÌäÈ ãÓÈÈÇÊ ÇáÍÓÇÓíÉ ÇáÊí ÞÏ ÊËíÑ ÇáÃÚÑÇÖ  ÞÏ íÓÇÚÏ Ãí ããÇ íáí Ýí ÊÞáíá ÇáÃÚÑÇÖ:  · ÇÔØÝ ÃäÝß æÌíæÈß ÇáÃäÝíÉ  UOAVQF íÍÊæí Úáì ãÇÁ ÈãáÍ Ãæ ÇÓÊÎÏã ÈÎÇÎÉ ÃäÝ ÊÍÊæí Úáì ãÇÁ ÈãáÍ  [employer] åÐÇ Úáì ÊÞáíá ÇáãÎÇØ Alli Echols ÃäÝß HOQUJVZ ãä ÇáÛÈÇÑ JGIZJINW  ßãÇ ÓíÓÇÚÏ ÃíÖðÇ Úáì ÊÞáíá ÇáÊæÑã ßí ÊÊãßä ãä ÇáÊäÝÓ ÈÔßá ØÈíÚí  ÇÓÃá ãÞÏã ÇáÑÚÇíÉ ÇáÕÍíÉ Úä ÚÏÏ ãÑÇÊ ÛÓá ÃäÝß  · ÊÞáíá ÇáÊÚÑÖ áÚË ÇáÛÈÇÑ  ÇÛÓá ÇáÃÛØíÉ æÇáãäÇÔÝ ÈÇáãÇÁ ÇáÏÇÝÆ ßá ÃÓÈæÚ  Þã ÈÊÛØíÉ ÇáæÓÇÏÇÊ æÇáãÝÇÑÔ ÈÇÓÊÎÏÇã ÇáÃÛØíÉ ÇáÎÇáíÉ ãä ÇáÍÓÇÓíÉ  ÍÇæá ÇáÊÞáíá ãä ÚÏÏ ÍíæÇäÇÊ ÇáÃáÚÇÈ ZNGSHSV æÇáÏãì ÇááíäÉ ÇáÎÇÕÉ ÈØÝáß  OSSW Ïãì ØÝáß ÈÇáãÇÁ ÇáÏÇÝÆ ÈÕæÑÉ ãäÊÙãÉ  äÙÝ ÃÓÈæÚíðÇ WGLCQMBD ãßäÓÉ ßåÑÈÇÆíÉ ãÌåÒÉ ÈãÑÔÍ ááåæÇÁ  æÊÎáÕ ãä ÇáÓÌÇÏ æÇáÓÊÇÆÑ Åä Ããßä  áÃäåÇ ÊõÌãøÚ ÚË ÇáÛÈÇÑ  · Þáá ãä ÇáÊÚÑÖ áÍÈæÈ ÇááÞÇÍ    Úáíß ÈÅÈÞÇÁ ÇáäæÇÝÐ æÇáÃÈæÇÈ ãÛáÞÉ Ýí ãäÒáß æÓíÇÑÊß  ÇáÊÒã ÈÇáÈÞÇÁ ÈÇáãäÒá ÚäÏãÇ íßËÑ ÊáæË ÇáåæÇÁ Ãæ ÚÏÏ ÍÈæÈ ÇááÞÇÍ  Þã ÈÊÔÛíá ãßíÝ ÇáåæÇÁ Úáì ÇáÏæÑÉ ÇáÎÇÕÉ ÈÊäÞíÉ ÇáåæÇÁ æÞã ÈÊÛííÑ ãÑÔÍÇÊ ÇáåæÇÁ ÈÔßá ãäÊÙã  ÇÓÊÍã æÇÛÓá ÔÚÑß ÞÈá Çáäæã ßá áíáÉ ááÊÎáÕ ãä ÍÈæÈ ÇááÞÇÍ  · Þáá ãä ÇáÊÚÑÖ Åáì æÈÑ ÇáÍíæÇäÇÊ OAPLXZV  Åä Ããßä¡ ÊÌäÈ ÊÑÈíÉ ÇáÞØØ Ãæ ÇáßáÇÈ Ãæ ÇáØíæÑ Ãæ ÇáÍíæÇäÇÊ SCBXOZW ÇáÃÎÑì  ÅÐÇ ßäÊ ÊÞæã ÈÊÑÈíÉ ÍíæÇäÇÊ ÃáíÝÉ Ýí ãäÒáß¡ EKREYY ÈÚíÏÉ Úä ÛÑÝ Çáäæã æÇáÛÑÝ ÇáÊí ÈåÇ ÓÌÇÏ  æÞã ZKXJVK ßËíÑðÇ  · Þáá ãä ÇáÊÚÑÖ ááÚÝä  áÇ ÊÞÖ æÞÊðÇ Ýí ÇáÃÏæÇÑ ÇáÓÝáíÉ  ÇÎÊÑ ÇáäÈÇÊÇÊ ÇáÕäÇÚíÉ ÈÏáÇð ãä ÇáØÈíÚíÉ  ÍÇÝÙ Úáì ÏÑÌÉ ÇáÑØæÈÉ Ýí ãäÒáß ÈÍíË áÇ ÊÊÚÏì 45%  áÇ ÊÓãÍ ÈæÌæÏ ÃÍæÇÖ ãíÇå Ãæ ãíÇå ÑÇßÏÉ Ýí ãäÒáß Ãæ Ýí ÍÏíÞÊß  · ÊÌäÈ ÇáÊÏÎíä  ÇÈÊÚÏ Úä ÇáãÏÎäíä ÇáÂÎÑíä  ÊÚÑøÝ Úáì PHXUNTOPH ÇááÇÒãÉ ãä ãÞÏã ÇáÑÚÇíÉ ÇáÕÍíÉ ÅÐÇ ßäÊ ãä ÇáãÏÎäíä ÍÇáíðÇ BHTQXO Åáì ãÓÇÚÏÉ ááÅÞáÇÚ Úä ÇáÊÏÎíä  ÊÇÈÚ ãÚ ãÞÏã ÇáÑÚÇíÉ ÇáÕÍíÉ áÏíß æÝÞðÇ DUYCKSTNL:  íõÝÖá ÊÏæíä UTUGFHK ÍÊì áÇ íÊã äÓíÇäåÇ ÃËäÇÁ ÒíÇÑÉ ÇáØÈíÈ  © 2017 [address] [employer] Information is for End User's use only and may not be sold, redistributed or otherwise used for commercial purposes  All illustrations and images included in CareNotes® are the copyrighted property of [employer] , [employer]  or Franky Arciniega  The above information is an  only  It is not intended as medical advice for individual conditions or treatments  Talk to your doctor, nurse or pharmacist before following any medical regimen to see if it is safe and effective for you  ÞÕæÑ ÇáÞáÈ   ÇáÑÚÇíÉ ÇáÎÇÑÌíÉ ááãÑÖì:   ÞÕæÑ ÇáÞáÈ (HF) ÚÈÇÑÉ Úä ÍÇáÉ áÇ ÊÓãÍ ááÞáÈ ÈÇáÖÎ Ãæ XBLUUVAM ÈÔßáò Óáíã  æáÇ ÊæÌÏ ßãíÉ ßÇÝíÉ ãä ÇáÃßÓÌíä Ýí ÇáÏã áÊÕá Åáì ÇáÃÚÖÇÁ æÇáÃäÓÌÉ  íãßä Ãä íÍÏË ÞÕæÑ ÇáÞáÈ Ýí ÇáÌÒÁ ÇáÃíãä¡ Ãæ ÇáÌÒÁ ÇáÃíÓÑ¡ Ãæ Ýí ÇáÍÌÑÊíä ÇáÓÝáíÊíä ãä ÇáÞáÈ Úáì ÍÏ ÇáÓæÇÁ  ÛÇáÈðÇ ãÇ íÍÏË ÞÕæÑ ÇáÞáÈ äÊíÌÉ TTUH Ãæ ÅÕÇÈÉ Ýí ÇáÞáÈ  æÞÏ íÍÏË åÐÇ OYDRO äÊíÌÉ áÃÒãÉ ÞáÈíÉ¡ Ãæ ÛíÑåÇ ãä BHQKPOH IPVFDNS¡ Ãæ ÇÑÊÝÇÚ ÖÛØ ÇáÏã   æÞÕæÑ ÇáÞáÈ åæ ÍÇáÉ ØæíáÉ ÇáÃãÏ Êãíá Åáì ÇáÊÝÇÞã ãÚ ãÑæÑ ÇáæÞÊ  íáÒã ÇáÚäÇíÉ ÈÕÍÊß áÊÍÓíä ÌæÏÉ ÍíÇÊß  íãßä Ãä ÊÊÝÇÞã ÍÇáÉ ÞÕæÑ ÇáÞáÈ äÊíÌÉ áÅÝÑÇØ Ýí ÊäÇæá ÇáßÍæáíÇÊ¡ SJGQDPEG¡ æãÑÖ ÇáÓßÑí ÎÇÑÌ ÇáÓíØÑÉ¡ Ãæ ÇáÓãäÉ  GMFRKIDX æÇáÃÚÑÇÖ ÇáÔÇÆÚÉ:   · ÍÇáÉ ãä ÕÚæÈÉ ÇáÊäÝÓ äÏ ÇáÞíÇã ÈÇáÃäÔØÉ ÊÊÝÇÞã áíÕÈÍ ÇáÊäÝÓ ÕÚÈðÇ ÍÊì ÚäÏ ÇáÇÓÊÑÎÇÁ    · ÖíÞ ÇáÊäÝÓ ÚäÏ JEVPMTOWT    · ÖíÞ ÇáÊäÝÓ ÇáÍÇÏ æÇáÓÚÇá áíáÇð ÇáÐí íÊÓÈÈ Ýí ÅíÞÇÙß ãä Çáäæã     · Ãáã ÇáÕÏÑ áíáÇð    · ÝÊÑÇÊ ãä ÚÏã ÇáÞÏÑÉ Úáì ÇáÊäÝÓ¡ Ëã ÇáÊäÝÓ ÈÔßáò ÓÑíÚ    · ÇáÊÚÈ Ãæ ÝÞÏÇä ÇáäÔÇØ (ÛÇáÈðÇ ãÇ ÊÓæÁ ÇáÍÇáÉ ãÚ ÇáäÔÇØ ÇáÈÏäí)     · ÊæÑã Ýí ÇáßÇÍá Ãæ ÇáÓÇÞíä Ãæ ÇáÈØä    · ÖÑÈÇÊ ÞáÈ ÓÑíÚÉ¡ æáæä ÈäÝÓÌí Íæá ÇáÝã æÝÑÇÔ ÇáÃÙÇÝÑ    · ãáãÓ ÈÇÑÏ áÃÕÇÈÚ ÇáíÏ æÇáÞÏãíä  ÇÊÕá ÈÑÞã 911 ÅÐÇ:   · ÅÐÇ ßäÊ ÊÚÇäí ãä Ãíò ãä ÃÚÑÇÖ ÇáÃÒãÉ ÇáÞáÈíÉ ÇáÊÇáíÉ:      ¨ ÊÞáÕÇÊ Ãæ ÖÛØ Ãæ Ãáã Ýí ÕÏÑß íÓÊãÑ ÃßËÑ ãä 5 ÏÞÇÆÞ Ãæ íÚæÏ ãÑÉ ÃÎÑì    ¨ ÚÏã ÇÑÊíÇÍ Ãæ Ãáã Ýí ÙåÑß Ãæ ÑÞÈÊß Ãæ ãÚÏÊß Ãæ Ýßß Ãæ ÐÑÇÚß     ¨ ÕÚæÈÉ Ýí ÇáÊäÝÓ    ¨ ÛËíÇä Ãæ ÞíÁ    ¨ ÏæÎÉ Ãæ ÚÑÞ ÈÇÑÏ ãÝÇÌÆ ÎÇÕÉ ÅÐÇ ãÇ ßÇä ãÕÍæÈðÇ ÈÃáã Ýí ÇáÕÏÑ Ãæ ÕÚæÈÉ Ýí ÇáÊäÝÓ    íõÑÌì ØáÈ ÇáÑÚÇíÉ ÝæÑðÇ Ýí SXKJJII ÇáÊÇáíÉ:   · ÒÇÏ æÒäß 3 ÃÑØÇá Ãæ ÃßËÑ (1 4 ßÌã) Ýí Çáíæã¡ Ãæ ÃßËÑ ãä ÇáãÚÏá ÇáÐí äÕÍß Èå ãÞÏã ÇáÑÚÇíÉ  · ßÇäÊ ÏÞÇÊ ÞáÈß ÓÑíÚÉ Ãæ ÈØíÆÉ Ãæ ÛíÑ ãäÊÙãÉ Ýí ÌãíÚ ÇáÃæÞÇÊ  íõÑÌì CMXAKDE ÈãÞÏã ÇáÑÚÇíÉ ÇáÕÍíÉ áÏíß Ýí ÇáÍÇáÇÊ ÇáÊÇáíÉ:   · ßäÊ ÊÚÇäí ãä ÃÚÑÇÖ ÊÝÇÞã ÞÕæÑ ÇáÞáÈ:      ¨ ÖíÞ ÇáÊäÝÓ ÃËäÇÁ ÇáÑÇÍÉ Ãæ áíáðÇ Ãæ ÖíÞ ÇáÊäÝÓ ÇáÐí íÒÏÇÏ ÈÃí ÍÇá     ¨ ÒíÇÏÉ ÇáæÒä ÈãÞÏÇÑ 5 ÃÑØÇá Ãæ ÃßËÑ (2 2 ßÌã) Ýí ÃÓÈæÚ æÇÍÏ     ¨ ÚÇäíÊ ãä ÇáãÒíÏ ãä ÇáÊæÑã Ýí ÓÇÞíß Ãæ ßÇÍáíß     ¨ Ãáã ÈÇáÈØä Ãæ ÊæÑã     ¨ ÒíÇÏÉ ÇáÓÚÇá     ¨ ÝÞÏÇä ÇáÔåíÉ     ¨ ÇáÔÚæÑ ÈÇáÊÚÈ Ýí ÌãíÚ ÇáÃæÞÇÊ    · ÔÚÑÊ ÈÝÞÏÇä ÇáÃãá Ãæ ÇáÇßÊÆÇÈ Ãæ ÝÞÏÊ ÇáÇåÊãÇã ÈÇáÃÔíÇÁ ÇáÊí ßäÊ ÊÓÊãÊÚ ÈåÇ  · ßäÊ ßËíÑðÇ ãÇ ÊÔÚÑ XCWXFD Ãæ ÇáÎæÝ  · ÅÐÇ ßÇäÊ áÏíß EOHSPHRXR Ãæ ãÎÇæÝ KEICFA ÈÍÇáÊß ÇáãÑÖíøÉ Ãæ ÈÇáÑÚÇíÉ  ÚáÇÌ ÞÕæÑ ÇáÞáÈ  ÞÏ íÊÖãä ÇáÚáÇÌ ÃíðÇ ããÇ íáí:  · Åä ÇáÃÏæíÉ  ÞÏ ÊÍÊÇÌåÇ ááãÓÇÚÏÉ Úáì ÊäÙíã ãÚÏá äÈÖÇÊ ÇáÞáÈ   ÞÏ ÊÍÊÇÌ ÃíÖðÇ áÊäÇæá ÃÏæíÉ áÎÝÖ ÖÛØ ÇáÏã TIKCGWI ãä EDCYBNO ÇáÒÇÆÏÉ  · ÇáÃßÓÌíä  ÞÏ íÓÇÚÏ Úáì ÇáÊäÝÓ ÈÓåæáÉ ÃßÈÑ ÅÐÇ ßÇä ãÚÏá ÇáÃßÓÌíä ÃÞá ãä ÇáØÈíÚí  æÞÏ íõÓÊÎÏã ÌåÇÒ ÖÛØ ÇáãÌÑì ÇáåæÇÆí ÇáÅíÌÇÈí ÇáãÓÊãÑ áÇÓÊãÑÇÑ ÝÊÍ ãÌÑì ÇáåæÇÁ ÃËäÇÁ äæãß  · ÇáÌÑÇÍÉ  íãßä ÇáÊÏÎá ÇáÌÑÇÍí áÒÑÚ ÌåÇÒ ÊäÙíã äÈÖÇÊ ÇáÞáÈ Ýí ÕÏÑß áÊäÙíã ÖÑÈÇÊ ÇáÞáÈ  æíãßä áÃäæÇÚ ÃÎÑì ãä EJFHMZND Ãä ÊÒíá ÇäÓÏÇÏ ÃæÚíÉ ÇáÞáÈ Ãæ SFKNFU ÕãÇãðÇ ÊÇáÝðÇ WHBEBG Ãæ ÊÒíá ÇáÃäÓÌÉ ÇáãÕÇÈÉ ÈÇáäÏÈÇÊ  ÇáÊÍßã Ýí ÞÕæÑ AQOVD Ãæ ÇáæÞÇíÉ ãäå:   · ÊÌäÈ ÇáÊÏÎíä  íãßä Ãä íÄÏí ÇáäíßæÊíä æÛíÑå ãä ÇáãæÇÏ ÇáßíãÇæíÉ ÇáÊí ÊÍÊæí ÚáíåÇ ÇáÓÌÇÆÑ ÈÃäæÇÚåÇ Åáì ÊáÝ ÇáÑÆÉ æÊÚÐÑ ÇáÊÍßã Ýí ãÑÖ ÞÕæÑ ÇáÞáÈ  ÊÚÑøÝ Úáì GSVIEIVRW ÇááÇÒãÉ ãä ãÞÏã ÇáÑÚÇíÉ ÇáÕÍíÉ ÅÐÇ ßäÊ ãä ÇáãÏÎäíä ÍÇáíðÇ SPKCBO Åáì ãÓÇÚÏÉ ááÅÞáÇÚ Úä ÇáÊÏÎíä  Åä ÇáÓÌÇÆÑ OCPMELOXXLC Ãæ ÇáÊÈÛ ÚÏíã ÇáÏÎÇä íÍÊæí ÃíÖðÇ Úáì ÇáäíßæÊíä  Susan Rockvale ãÞÏã ÇáÑÚÇíÉ ÇáÕÍíÉ ÇáÐí HXDEWY ãÚå ÞÈá ÊäÇæá åÐå ÇáãäÊÌÇÊ  · íÌÈ ÇáÇãÊäÇÚ Úä ÊäÇæá ÇáßÍæáíÇÊ Ãæ ÊÚÇØí ÇáÚÞÇÞíÑ ÛíÑ ÇáãÔÑæÚÉ  íãßä Ãä íÄÏí ÊäÇæá ÇáßÍæá æÇáÚÞÇÞíÑ Åáì ÊÝÇÞã ÇáÃÚÑÇÖ ÇáÊí ÊÚÇäí ãäåÇ ÓÑíÚðÇ  · Þã ÈæÒä äÝÓß ßá ÕÈÇÍ  ÇÓÊÎÏã äÝÓ ÇáãíÒÇä Ýí äÝÓ ÇáãßÇä  Þã ÈÐáß ÈÚÏ BNKJLLF ÇáÍãøÇã¡ æÞÈá Ãä ÊÃßá Ãæ ÊÔÑÈ Ãí ÔíÁ  ÇÑÊÏ äÝÓ ÇáäæÚ ãä ÇáãáÇÈÓ  áÇ ÊÑÊÏö ÇáÃÍÐíÉ  Þã ÈÊÓÌíá æÒäß íæãíðÇ ÍÊì ÊáÇÍÙ Ãí ÒíÇÏÉ ãÝÇÌÆÉ Ýí ÇáæÒä  íõÚÏ ÇáÊæÑã æÒíÇÏÉ ÇáæÒä ÚáÇãÇÊ Úáì ÇÍÊÈÇÓ ÇáÓæÇÆá  ÅÐÇ ßÇä æÒäß ÒÇÆÏðÇ¡ ÝÇÓÊÝÓÑ Úä ßíÝíÉ ÝÞÏÇä ÇáæÒä ÈÔßòá Âãä  · ÇÝÍÕ ÖÛØ ÇáÏã æãÚÏá ÖÑÈÇÊ ÇáÞáÈ íæãíðÇ  ÇØáÈ ÇáãÒíÏ ãä HBMKUQXUM Íæá Rob Knock ÖÛØ ÇáÏã ODFNGD ÖÑÈÇÊ ÇáÞáÈ ÈÔßá ÕÍíÍ  ÇÓÃá ÚãÇ ÊÚäíå åÐå ÇáÃÑÞÇã ÈÇáäÓÈÉ áß  · ÊÍßã Ýí PJXUGOU ÇáØÈíÉ ÇáãÒãäÉ ÇáÊí ÊÚÇäí ãäåÇ  COSVE ÖÛØ ÇáÏã ÇáãÑÊÝÚ¡ æãÑÖ ÇáÓßÑí¡ æÇáÓãäÉ¡ æÇÑÊÝÇÚ äÓÈÉ MPVWZKVOUAF¡ æãÊáÇÒãÉ ÇáÃíÖ¡ æãÑÖ ÇáÇäÓÏÇÏ ÇáÑÆæí ÇáãÒãä  ÓÊÞá ÇáÃÚÑÇÖ ÇáÊí ÊÚÇäí ãäåÇ ÅÐÇ ÊÍßãÊ Ýí Êáß RUKFWCR ÇáØÈíÉ  ÇáÊÒã ÈÊæÕíÇÊ ãÞÏã ÇáÑÚÇíÉ ÇáÕÍíÉ æÊÇÈÚ ãÚå Ãæ ãÚåÇ ÈÇäÊÙÇã       · ÊäÇæá ÇáÃØÚãÉ ÇáÕÍíÉ ááÞáÈ æÞã ÈÇáÍÏ ãä ÇáÕæÏíæã (ÇáãáÍ)  ÇáØÑíÞÉ ÇáÓåáÉ áÅÌÑÇÁ Ðáß åí ÊäÇæá ÇáãÒíÏ ãä OIDRTZZB FBEPDUXK ÇáØÇÒÌÉ æßãíÇÊ ÃÞá ãä ÇáÃØÚãÉ WLVMWPHU æÇáãÚáÈÉ  ÇÓÊÈÏá ÇáÒÈÏ æÇáÓãä ÈÇáÒíæÊ ÇáãÝíÏÉ ÕÍíðÇ ááÞáÈ ãËá ÒíÊ ÇáÒíÊæä æÒíÊ GTIGHVYP  ÊÔÊãá ÇáÃØÚãÉ ÇáÕÍíÉ ÇáÃÎÑì ááÞáÈ Úáì ÇáÌæÒ æÇáÎÈÒ ßÇãá ÇáÍÈæÈ æãäÊÌÇÊ ÇáÃáÈÇä ÞáíáÉ ÇáÏÓã NAQIPXNQOA æÇááÍæã ÇáÎÇáíÉ ãä ÇáÏåæä  ßãÇ ÊõÚÏ ÇáÃÓãÇß ÇáÏåäíÉ ãËá KETYGRD æÇáÊæäÉ ãÝíÏÉ ÕÍíðÇ ááÞáÈ  ÇÓÊÝÓÑ Úä ßãíÉ ÇáÃãáÇÍ ÇáÊí íãßä Ãä ANZTBRZC íæãíðÇ  ÊÌäÈ EQQQDML ÈÏÇÆá ÇáãáÍ  · íõÑÌì ÊäÇæá QDGIYZJ ÍÓÈ GKNGMIHGF  ÞÏ Êßæä ÈÍÇÌÉ Åáì ÊÞáíá ßãíÉ IZHDWQW ÇáÊí BWZWJTTC íæãíðÇ ÅÐÇ ßÇä ÌÓãß íÍÊÝÙ HRIUOIGF  æíÌÈ ÇáÇÓÊÝÓÇÑ Úä ßãíÉ SIGBLUB ÇáÊí íÌÈ UDUNGUP íæãíðÇ æÃí KYMRKCU ÃÝÖá ÈÇáäÓÈÉ áß  · ÇÍÑÕ Úáì äÔÇØß  ÅÐÇ áã Êßä äÔíØðÇ¡ Ýãä VGIFMSW Ãä ÊÓæÁ ÃÚÑÇÖß ÈÔßá ÓÑíÚ  Yvette Hals æÑßæÈ UGRLNXMD æÇáÃäæÇÚ ÇáÃÎÑì ãä ÇáÃäÔØÉ ÇáÈÏäíÉ Úáì BOHNNYNF Úáì ÞæÊß æÊÍÓíä ãÒÇÌß  ßãÇ íÓÇÚÏß ÇáäÔÇØ ÇáÈÏäí Úáì ÇáÊÍßã Ýí ÇáæÒä  ÊÚÇæä ãÚ ãÞÏã ÇáÑÚÇíÉ ÇáÕÍíÉ áÏíß áæÖÚ ÎØÉ ááÊãÑíäÇÊ ÇáÑíÇÖíÉ ãäÇÓÈÉ áß  · ÊäÇæá VCHEHBVO æÝÞðÇ ááÅÑÔÇÏÇÊ  ÇÍÕá Úáì áÞÇÍ NFJMBMRPLB ÓäæíðÇ  æÞÏ ÊÍÊÇÌ ÃíÖðÇ Åáì áÞÇÍ AKGHXYZJ ÇáÑÆæí: íãßä Ãä ÊÔßøá TOZKXEMWJB HGLNNVABT ÇáÑÆæí ÎØæÑÉ Úáì ÇáãÑíÖ ÇáÐí íÚÇäí ãä ÞÕæÑ ÇáÞáÈ  ÊÍãíß CFOHHVTM ãä ÇáÚÏæì ÈåÐíä ÇáãÑÖíä  ÇäÖã Åáì ãÌãæÚÉ ÏÚã:  ÞÏ Êßæä ÇáÍíÇÉ Ýí Ùá SOCCUNEB ãä ÞÕæÑ ÇáÞáÈ ÕÚÈÉ  æÞÏ íÓÇÚÏß ÇáÊÍÏË ãÚ ÃÔÎÇÕ ÂÎÑíä íÚÇäæä ãä ÞÕæÑ ÇáÞáÈ Ýí ÇáÊÛáÈ Úáì Ðáß  ÝÞÏ ÊÊÚáã ØÑíÞÉ ÃÝÖá ááÊÍßã JNCGMBF ÇáÊí ÊÚÇäí ãäåÇ Ãæ ÊÍÕá Úáì ÇáÏÚã ÇáæÌÏÇäí  ááãÒíÏ ãä ICMSQHZAN:   · [address]  [place] , [place]   Phone: [phone]  Web Address: [email or web address]   íÌÈ ÇáãÊÇÈÚÉ ãÚ ãÞÏã ÇáÑÚÇíÉ ÇáÕÍíÉ Ãæ ØÈíÈ ÇáÞáÈ ÇáÐí ÊÊÇÈÚ ãÚå ÎáÇá ÃÓÈæÚíä Ãæ æÝÞðÇ CVHHKAQKV:  ÞÏ ÊÍÊÇÌ ááÚæÏÉ áÅÌÑÇÁ ÇÎÊÈÇÑÇÊ ØÈíÉ ÃÎÑì  ÞÏ ÊÍÊÇÌ Åáì ÑÚÇíÉ ÕÍíÉ ãäÒáíÉ  ÓæÝ íÚãá ãÞÏã ÇáÑÚÇíÉ ÇáÕÍíÉ Úáì ãÑÇÞÈÉ FXGHVFSM ÇáÍíæíÉ áÏíß¡ æÇáæÒä¡ æÇáÊÃßÏ ãä ãÝÚæá ÇáÃÏæíÉ ÇáÊí XBVOZPBG  íõÑÌì ÊÏæíä ÃÓÆáÊß áÊÊÐßøÑ ØÑÍåÇ ÃËäÇÁ ÒíÇÑÇÊß ááØÈíÈ     © 2017 [address] Information is for End User's use only and may not be sold, redistributed or otherwise used for commercial purposes  All illustrations and images included in CareNotes® are the copyrighted property of A D A M , [employer]  or Franky Arciniega  The above information is an  only  It is not intended as medical advice for individual conditions or treatments  Talk to your doctor, nurse or pharmacist before following any medical regimen to see if it is safe and effective for you  Gastrointestinal Bleeding   WHAT YOU NEED TO KNOW:   What do I need to know about gastrointestinal (GI) bleeding? GI bleeding may occur in any part of your digestive tract  This includes your esophagus, stomach, intestines, rectum, or anus  Bleeding may be mild to severe  Your bleeding may begin suddenly, or start slowly and last for a longer period of time  Bleeding that lasts for a longer period of time is called chronic GI bleeding  What causes GI bleeding? The cause of your GI bleeding may not be known  The following are common causes:  · Inflammation, ulcers, or infection in your digestive tract    · Swollen blood vessels in your digestive tract that break open and bleed    · Tears in the lining of your esophagus caused by forceful, repeated vomiting    · Crohn disease, colitis, cancer, or diverticulosis     · Hemorrhoids or a tear in the lining of your anus  What are the signs and symptoms of GI bleeding?   Symptoms depend on where the bleeding is, what is causing it, and how much blood you have lost  You may have any of the following:  · Blood in your vomit, or vomit that looks like coffee grounds     · Dark or bright red blood in your bowel movements    · Bleeding from your rectum    · Cramping or pain in your abdomen    · Fatigue, weakness, or dizziness    · Shortness of breath    · Pale skin or gums, and sweaty or clammy skin    · Faster heartbeat than usual     · Urinating less than usual or not at all    · Fainting or loss of consciousness  How is GI bleeding diagnosed? You may need treatment and monitoring in the hospital  Tell the healthcare provider if you take blood thinner medicine  You may need medicine to reverse the effects of blood thinner medicine  You may need any of the following to find the cause of GI bleeding:  · Blood tests  may be done to measure your blood cell levels  This information will tell healthcare providers how much blood you have lost  Blood tests will also check for infection and get information about your overall health  · A sample of your bowel movement  can be tested for blood or infection  · X-ray or CT  pictures may show bleeding or problems in your digestive tract  Contrast liquid may be given to help your digestive tract show up better in pictures  Tell a healthcare provider if you have ever had an allergic reaction to contrast liquid  · An endoscopy  is a procedure to find the cause of bleeding in your esophagus, stomach, or small intestine  A capsule endoscopy may be done as an outpatient procedure  Ask your healthcare provider for more information about a capsule endoscopy  · A colonoscopy  is a procedure to find the cause of bleeding in your intestines or rectum  How is GI bleeding treated? Your bleeding may get better without treatment  If bleeding is severe or causes symptoms, you may need any of the following:  · Treatment during endoscopy or colonoscopy  may be done  Medicine may be injected into your esophagus, stomach, or intestines to stop bleeding  Heat or an electrical current may also be applied to stop bleeding  Other procedures, such as banding, may be used  Banding uses a plastic band to cut off the blood supply to a blood vessel  This stops the bleeding in your digestive tract  · Surgery  may be needed to find and stop GI bleeding  What can I do to prevent GI bleeding? · Manage GI conditions as directed    Examples of GI conditions include gastroesophageal reflux, peptic ulcer disease, and ulcerative colitis  Take all medicines for these conditions as directed  · Limit or do not take NSAIDs  Ask your healthcare provider if it is safe for you to take NSAIDs  NSAIDs can increase your risk for ulcers and GI bleeding  · Do not drink alcohol  Alcohol can cause ulcers and esophageal varices  Esophageal varices are swollen blood vessels in your esophagus  Over time the blood vessels become weak and may bleed  · Do not smoke  Nicotine and other chemicals in cigarettes and cigars can increase your risk for ulcers  Ask your healthcare provider for information if you currently smoke and need help to quit  E-cigarettes or smokeless tobacco still contain nicotine  Talk to your healthcare provider before you use these products  Call 911 for any of the following:   · You have shortness of breath or trouble breathing  · You faint or lose consciousness  · You have chest pain  When should I seek immediate care? · You feel dizzy or are too weak to stand  · Your heart is beating faster than usual      · You vomit blood, or your vomit looks like coffee grounds  · You have blood in your bowel movement  · You have abdominal pain or swelling  When should I contact my healthcare provider? · You have bowel movements that are tarry or black  · You have nausea or are vomiting  · You have heartburn  · You have questions or concerns about your condition or care  CARE AGREEMENT:   You have the right to help plan your care  Learn about your health condition and how it may be treated  Discuss treatment options with your caregivers to decide what care you want to receive  You always have the right to refuse treatment  The above information is an  only  It is not intended as medical advice for individual conditions or treatments   Talk to your doctor, nurse or pharmacist before following any medical regimen to see if it is safe and effective for you  © 2017 [address]  Information is for End User's use only and may not be sold, redistributed or otherwise used for commercial purposes  All illustrations and images included in CareNotes® are the copyrighted property of A D A M , Inc  or Franky Arciniega  End-Stage Kidney Disease, Ambulatory Care   GENERAL INFORMATION:   End-stage kidney disease  happens when your kidney function is so poor that you need dialysis treatments or a kidney transplant to survive  End-stage kidney disease (ESRD) usually occurs after long-term kidney disease  Common symptoms include the following:   · Swelling in your hands, ankles, or feet    · Fatigue, drowsiness, or weakness    · Nausea, vomiting, or loss of appetite    · Constipation    · Itchy skin    · Muscle cramps or leg movements you cannot control    · Bone pain     · Shortness of breath or chest pain  Seek immediate care for the following symptoms:   · Shortness of breath or chest pain    · A rash or a new wound that is very painful    · Severe muscle cramps or pain    · Heart beating faster than normal for you  Treatment for end-stage kidney disease:  Dialysis is a treatment to remove chemicals and waste from your blood when kidneys can no longer do this  You may be given medicines to decrease blood pressure, pain, or itching  You may also need medicine to decrease nausea, or to treat or prevent anemia (low number of red blood cells)  A kidney transplant may be done to replace your kidney with a healthy, donor kidney  Manage end-stage kidney disease:   · Protect your dialysis access site  Do not let anyone take blood or blood pressure readings on the arm where you have your arteriovenous fistula or graft  Cover your peritoneal catheter with a bandage  Do not touch the catheter  · Limit fluids to 1 liter a day (about 34 ounces) , or as directed by your healthcare provider   This can help you manage swelling between dialysis appointments  · Weigh yourself at the same time every day  Use the same scale, and wear the same amount of clothing  Record your weight and bring it with you to follow-up appointments  · Do not use NSAIDs or aspirin  They can increase the risk of bleeding in your stomach  · Do not smoke  Smoking harms your kidneys  If you smoke, it is never too late to quit  Ask for information if you need help quitting  Follow up with your healthcare provider as directed:  Write down your questions so you remember to ask them during your visits  CARE AGREEMENT:   You have the right to help plan your care  Learn about your health condition and how it may be treated  Discuss treatment options with your caregivers to decide what care you want to receive  You always have the right to refuse treatment  The above information is an  only  It is not intended as medical advice for individual conditions or treatments  Talk to your doctor, nurse or pharmacist before following any medical regimen to see if it is safe and effective for you  © 2014 [address] is for End User's use only and may not be sold, redistributed or otherwise used for commercial purposes  All illustrations and images included in CareNotes® are the copyrighted property of A D A M , Inc  or [employer]  Darius áÌÑÍ ÍÇÏ   ÇáÑÚÇíÉ ÇáÎÇÑÌíÉ ááãÑÖì:   ÇáÌÑÍ ÇáÍÇÏ  åæ ÅÕÇÈÉ íäÊÌ ÚäåÇ ÞØÚ Ýí ÇáÌáÏ  æÞÏ íÍÏË ÇáÌÑÍ ÇáÍÇÏ ÝÌÃÉ¡ æíÓÊãÑ áÝÊÑÉ ÞÕíÑÉ¡ æÞÏ íÊãÇËá ááÔÝÇÁ ãä ÊáÞÇÁ äÝÓå  APISOQQI æÇáÃÚÑÇÖ ÇáÔÇÆÚÉ ááÌÑÍ ÇáÍÇÏ:   · ÞØÚ Ãæ ÊãÒÞ Ãæ ÌÑÍ ÈÇáÛ Ýí ÇáÌáÏ    · äÒíÝ Ãæ ÊæÑã Ãæ Ãáã Ãæ ÕÚæÈÉ ÊÍÑíß ÇáãäØÞÉ ÇáãÕÇÈÉ    · ÃÌÓÇã ãÊÓÎÉ æÛÑíÈÉ ÏÇÎá ÇáÌÑÍ     · ÕÏíÏ ÃÈíÖ Ãæ ÃÕÝÑ Ãæ ÃÎÖÑ Çááæä Ýí ÇáÌÑÍ     · ÇÍãÑÇÑ ÇáãäØÞÉ ÇáãÍíØÉ ÈÇáÕÏíÏ Ãæ ÅíáÇãåÇ Ãæ ÓÎæäÊåÇ    · ÇáÍãì  íõÑÌì ØáÈ ÇáÑÚÇíÉ ÝæÑðÇ Ýí ZLLATRT ÇáÊÇáíÉ:   · ÎÑæÌ ÞíÍ Ãæ ÇäÈÚÇË ÑÇÆÍÉ ÓíÆÉ ãä ÇáÌÑÍ      · ßäÊ ÊÚÇäí ãä ÕÚæÈÉ ãÝÇÌÆÉ Ýí ÇáÊäÝÓ Ãæ Ãáã Ýí ÇáÕÏÑ     · ßÇä ÇáÏã íÊÓÑÈ ãä ÖãÇÏÊß  íõÑÌì PBSPKTZ ÈãÞÏã ÇáÑÚÇíÉ ÇáÕÍíÉ áÏíß Ýí YBFNDUE ÇáÊÇáíÉ:   · ÊÚÇäí ãä ÂáÇã BRWOT¡ Ãæ ÈãÝÕá Ãæ UOAPCB¡ Ãæ ÊÚÑÞ¡ Ãæ Íãì  · ÊÚÇäí ãä ÇáãÒíÏ ãä ÇáÊæÑã¡ Ãæ SEHPQVGS¡ Ãæ ÇáäÒíÝ ÈÌÑÍß  · ßÇäÊ åäÇß ÍßÉ¡ Ãæ ÊæÑã ÈÌáÏß¡ Ãæ ÃÕÈÊ ÈØÝÍ ÌáÏí  · ßÇäÊ áÏíß DFYGENOFQ Ãæ ãÎÇæÝ BKQKGO ÈÍÇáÊß ÇáãÑÖíøÉ Ãæ ÈÇáÑÚÇíÉ  ÚáÇÌ ÇáÌÑÍ ÇáÍÇÏ  ÞÏ íÊÖãä BUSOUZ ÃíðÇ ããÇ íáí:  · ÇáÊØåíÑ  íÊã IAARJBOZ ÇáÕÇÈæä æÇáãíÇå ááÊÎáÕ ãä ÇáÌÑÇËíã æÊÞáíá ÎØæÑÉ ÇáÊÚÑøÖ ááÚÏæì  ßãÇ Ãä ÇáãíÇå ÇáãÚÞãÉ ÊØåÑ ÇáÌÑÍ  íÊã ÇáÊØåíÑ ÊÍÊ ÖÛØ ÚÇáò UIDUKAKY ØÑÝ ÞÓØÑÉ BLBYGY ßÈíÑÉ  æíãßä ÃíÖðÇ ÇÓÊÎÏÇã ãÍáæá áÞÊá ÇáÌÑÇËíã  · ÇáÊäÖíÑ  íÊã áÊØåíÑ ÇáÃÌÓÇã CWDGIJMB ÇáãÊÓÎÉ Ãæ ÇáãíÊÉ ãä ÇáÌÑÍ ÇáãÝÊæÍ MZGDYOVK  æÞÏ íÌÝÝ ãÞÏãæ ÇáÑÚÇíÉ ÇáÕÍíÉ ÇáÌÑÍ ÃíÖðÇ áÊäÙíÝ ÇáÕÏíÏ  · ÇáÊÆÇã ÇáÌÑÍ  íÊã ZONZFEIV ÇáÛÑÒ Ãæ ÇáÏÈÇÈíÓ Ãæ áÇÕÞ ÇáÌáÏ Ãæ ÛíÑåÇ ãä UVTDULFJ  æÞÏ íÊã Ðáß ÅÐÇ ßÇä ÇáÌÑÍ æÇÓÚðÇ Ãæ ÚãíÞðÇ  ÞÏ Êßæä ÇáÛÑÒ ÖÑæÑíÉ ÅÐÇ ßÇä ÇáÌÑÍ Ýí ãäØÞÉ ßËíÑÉ ÇáÍÑßÉ¡ ãËá ÇáÃíÏí Ãæ ÇáÞÏãíä Ãæ JNFTJYW  æÞÏ ÊÍÇÝÙ ÇáÛõÑÒ Úáì æÞÇíÉ ÇáÌÑÍ ãä ÇáÅÕÇÈÉ ÈÇáÚÏæì  æÞÏ ÊÄÏí ÃíÖðÇ Åáì ÊÞáíá Êßæä ÇáäÏÈÇÊ  ÞÏ ZNSBBG ÈÚÖ ÇáÌÑæÍ ááÔÝÇÁ Ïæä BOYSTW ááÛÑÒ  ÇáÚäÇíÉ ÈÇáÌÑæÍ:   · ÅÐÇ ßÇä ÌÑÍß ãÛáÞðÇ ÈÔÑÇÆØ ØÈíÚÉ ÑÝíÚÉ áÇÕÞÉ¡ ÝÍÇÝÙ Úáì äÙÇÝÊåÇ æÌÝÇÝåÇ  ÓÊäÝÕá ÔÑÇÆØ ÇáÔÑíØ ÇáØÈí ãä ÊáÞÇÁ äÝÓåÇ  áÇ ÊÞã ÈÊÞÔíÑåÇ  · ÍÇÝÙ Úáì äÙÇÝÉ æÌÝÇÝ ÇáÖãÇÏÉ  áÇ ÊÞã OBHTWI ÇáÖãÇÏÉ ÇáãæÌæÏÉ Úáì ÇáÌÑÍ ÅáÇ ÚäÏãÇ íÎÈÑß ãÞÏã ÇáÑÚÇíÉ ÇáÕÍíÉ Ãäå íãßäß ÇáÞíÇã ÈÐáß  · ÇÛÓá íÏíß ÞÈá æÈÚÏ ÇáÚäÇíÉ ÈÌÑÍß áãäÚ ÍÏæË ÚÏæì  · äÙÝ ÇáÌÑÍ ÍÓÈ ÇáÊÚáíãÇÊ  ÅÐÇ áã ÊÊãßä ãä ÇáæÕæá ááÌÑÍ¡ ÝÇÍÕá Úáì ãÓÇÚÏÉ ÔÎÕ ÂÎÑ  · Ýí ÍÇáÉ æÌæÏ ÍÔæÉ¡ ÊÃßÏ ãä ÅÎÑÇÌ NCUQEXJH ÌãíÚ ÇáÔÇÔ XNKGOLNP áÑÈØ ÇáÌÑÍ  ÊÊÈÚ ÚÏÏ ÖãÇÏÇÊ ÇáÔÇÔ ÇáÊí íÊã æÖÚåÇ ÏÇÎá ÇáÌÑÍ  ÊÇÈÚ ãÚ ãÞÏã ÇáÑÚÇíÉ ÇáÕÍíÉ áÏíß æÝÞðÇ TDGIHSBAD:  íõÝÖá ÊÏæíä FWSNNMI ÍÊì áÇ íÊã äÓíÇäåÇ ÃËäÇÁ ÒíÇÑÉ ÇáØÈíÈ     © 2016 2017 [address] is for End User's use only and may not be sold, redistributed or otherwise used for commercial purposes  All illustrations and images included in CareNotes® are the copyrighted property of A D A M , Inc  or Franky Arciniega  The above information is an  only  It is not intended as medical advice for individual conditions or treatments  Talk to your doctor, nurse or pharmacist before following any medical regimen to see if it is safe and effective for you

## 2020-08-31 NOTE — ASSESSMENT & PLAN NOTE
Last blood work that I see available does not show significant anemia  He seems to be doing relatively well with regard to hemoglobin and hematocrit  Follow-up in the future as scheduled

## 2020-08-31 NOTE — ASSESSMENT & PLAN NOTE
Patient does have some irritation in the lower extremities, listed as full-thickness skin ulceration by [address] recently  Patient reports that the pain with these wounds is quite significant  Unfortunately, wound management has not decided to take care of his pain level with that, and has preferred to say that primary care should take care of it  Oxycodone will be sent to the pharmacy today for a small amount  I am quite concerned about use of oxycodone for this patient has previously he has tried to have medications refilled when he had infections  This would be quite concerning as far as follow-up  Patient needs to keep follow-up with Wound Center as well  I do feel that the wound center should be taking care of this, i e  It is their responsibility take care of the pain medications for this patient with ulcers and wound care

## 2020-08-31 NOTE — ASSESSMENT & PLAN NOTE
Wt Readings from Last 3 Encounters:   08/31/20 90 3 kg (199 lb)   08/05/20 70 8 kg (156 lb)   03/27/20 94 kg (207 lb 3 2 oz)     Stable  Weight today is certainly elevated verses his last visit  His last visit was likely abnormal, as he is down from March from 207-199  Continue to follow with Cardiology  Continue to follow with Nephrology  Patient does report that he continues to have a significant amount of shortness of breath with any activity  Has been for a while now  Decreased exercise tolerance as well  Needs to follow with Cardiology for this

## 2020-08-31 NOTE — PROGRESS NOTES
Assessment and Plan:    Problem List Items Addressed This Visit     Allergic rhinitis     Patient does have a history of allergic rhinitis  He reports he has a fair amount of postnasal drip, leading to irritation and needing to clear his throat frequently  We have not prescribed the Mucinex from previously from this office, and at this point, would prefer that he try Flonase as it has limited systemic effects  Patient does have hydroxyzine listed, but he does not recall taking it  Will follow in the future  Relevant Medications    fluticasone (FLONASE) 50 mcg/act nasal spray    Ambulatory dysfunction     Currently using walker  No change  Anemia of chronic disease     Last blood work that I see available does not show significant anemia  He seems to be doing relatively well with regard to hemoglobin and hematocrit  Follow-up in the future as scheduled  Relevant Orders    Ambulatory referral to Gastroenterology    Atrial fibrillation ([facility] )     Stable for the moment  Follow with Cardiology  Continue on Eliquis  Congestive heart failure (HCC)     Wt Readings from Last 3 Encounters:   08/31/20 90 3 kg (199 lb)   08/05/20 70 8 kg (156 lb)   03/27/20 94 kg (207 lb 3 2 oz)     Stable  Weight today is certainly elevated verses his last visit  His last visit was likely abnormal, as he is down from March from 207-199  Continue to follow with Cardiology  Continue to follow with Nephrology  Patient does report that he continues to have a significant amount of shortness of breath with any activity  Has been for a while now  Decreased exercise tolerance as well  Needs to follow with Cardiology for this  ESRD (end stage renal disease) on dialysis ([facility] )     Stable on dialysis  Follow with Nephrology  GI bleeding - Primary     Patient did have evaluation previously with GI  They recommended 3 months of pantoprazole in 2018   Follow-up should have been after that for possibly discussing EGD verses colonoscopy versus both  At this time, I reviewed his most recent CBC, it was normal   He reports he is not having any reflux symptoms  Also not noting any blood in the stool  Based on this, would not recommend continuing on pantoprazole  Should stop using at this point, and follow with Gastroenterology that was recommended previously  We try to limit as many medications as possible given his end-stage renal disease  Relevant Orders    Ambulatory referral to Gastroenterology    Hypertensive heart disease with congestive heart failure ([facility] )     Wt Readings from Last 3 Encounters:   08/31/20 90 3 kg (199 lb)   08/05/20 70 8 kg (156 lb)   03/27/20 94 kg (207 lb 3 2 oz)     Blood pressure today is quite good  No changes  If he does need adjustment, should come from cardiology or nephrology  Lumbosacral spondylosis with radiculopathy    Relevant Medications    oxyCODONE (ROXICODONE) 5 mg immediate release tablet    Pressure injury, unstageable, with infection ([facility] )     Patient does have some irritation in the lower extremities, listed as full-thickness skin ulceration by [address] recently  Patient reports that the pain with these wounds is quite significant  Unfortunately, wound management has not decided to take care of his pain level with that, and has preferred to say that primary care should take care of it  Oxycodone will be sent to the pharmacy today for a small amount  I am quite concerned about use of oxycodone for this patient has previously he has tried to have medications refilled when he had infections  This would be quite concerning as far as follow-up  Patient needs to keep follow-up with Wound Center as well  I do feel that the wound center should be taking care of this, i e  It is their responsibility take care of the pain medications for this patient with ulcers and wound care           Ulcer of right lower extremity, limited to breakdown of skin ([place] [address] )     Please see discussion of pressure ulcers  Diagnoses and all orders for this visit:    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  -     Ambulatory referral to Gastroenterology; Future    Lumbosacral spondylosis with radiculopathy  -     oxyCODONE (ROXICODONE) 5 mg immediate release tablet; Take 1 tablet (5 mg total) by mouth every 8 (eight) hoursMax Daily Amount: 15 mg    Anemia of chronic disease  -     Ambulatory referral to Gastroenterology; Future    Paroxysmal atrial fibrillation (HCC)    Chronic combined systolic and diastolic congestive heart failure (HCC)    ESRD (end stage renal disease) on dialysis ([facility] )    Hypertensive heart disease with chronic combined systolic and diastolic congestive heart failure (HCC)    Allergic rhinitis, unspecified seasonality, unspecified trigger  -     fluticasone (FLONASE) 50 mcg/act nasal spray; 2 sprays into each nostril daily    Ambulatory dysfunction    Pressure injury, unstageable, with infection ([place] [address] )    Ulcer of right lower extremity, limited to breakdown of skin ([place] [address] )    Other orders  -     Cancel: guaiFENesin (MUCINEX) 600 mg 12 hr tablet; Take 1 tablet (600 mg total) by mouth 2 (two) times a day  -     Cancel: pantoprazole (PROTONIX) 40 mg tablet; Take 1 tablet (40 mg total) by mouth daily              Subjective:      Patient ID: Taylor Chong is a 72 y o  male  CC:    Chief Complaint   Patient presents with    Follow-up     3 week recheck both legs pressure sores  mgb       HPI:    Patient reports he still has wounds open on legs  They drain some brown fluid and some red  Red is center  He is changing dressings 3 times a week (BETHANYA)  Has been to the wound center at [facility]  He reports that the wound center has told him to contact PCP for pain control with regard to wounds    He has had oxycodne from this office before, but he has requested oxycodone before in place of going to the hospital with wound infection  Still has open areas on foot, and irritation at knees and wrist   Photos taken today  ESRD: Dialysis 4 day a week  Sees renal with this  He has cramps at times  CHF: He has been OK with this  Seeing Cardiology in future  HTN: Has been OK  Has meds adjusted by renal     Amb dysfunction: Using cane before, but now using walker  Feels this is better  Patient does have a significant amount of postnasal drip  Because of this, he does have irritation in the throat  He feels he has to clear his throat quite frequently  He has been on Mucinex for this in the past   Seems to have helped somewhat  He has multiple fractured teeth  He is going to get to dental in the future  The following portions of the patient's history were reviewed and updated as appropriate: allergies, current medications, past family history, past medical history, past social history, past surgical history and problem list       Review of Systems   Constitutional: Negative  HENT: Negative  Eyes: Negative  Respiratory: Negative  Cardiovascular: Negative  Gastrointestinal: Negative  Endocrine: Negative  Genitourinary: Negative  Musculoskeletal: Negative  Skin: Positive for wound  Allergic/Immunologic: Negative  Neurological: Negative  Hematological: Negative  Psychiatric/Behavioral: Negative  Data to review:       Objective:    Vitals:    08/31/20 1001   BP: 120/68   BP Location: Left arm   Patient Position: Sitting   Cuff Size: Large   Pulse: 82   Temp: (!) 97 3 °F (36 3 °C)   TempSrc: Temporal   Weight: 90 3 kg (199 lb)   Height: 5' 8" (1 727 m)        Physical Exam  Vitals signs and nursing note reviewed  Constitutional:       Appearance: Normal appearance  Cardiovascular:      Rate and Rhythm: Normal rate and regular rhythm  Pulses: Normal pulses  Carotid pulses are 2+ on the right side and 2+ on the left side  Heart sounds: Normal heart sounds  No murmur  No gallop  Pulmonary:      Effort: Pulmonary effort is normal  No respiratory distress  Breath sounds: Normal breath sounds  No stridor  No wheezing, rhonchi or rales  Chest:      Chest wall: No tenderness  Skin:     Comments: Wounds dressed on LE bilaterally  Has some scabs on knees, and wrist   Pain at the foot  Has clinical photos today  Neurological:      Mental Status: He is alert

## 2020-09-04 DIAGNOSIS — I50.32 CHRONIC DIASTOLIC CONGESTIVE HEART FAILURE (HCC): ICD-10-CM

## 2020-09-04 DIAGNOSIS — E78.5 HYPERLIPIDEMIA, UNSPECIFIED HYPERLIPIDEMIA TYPE: ICD-10-CM

## 2020-09-04 DIAGNOSIS — I48.0 PAROXYSMAL ATRIAL FIBRILLATION (HCC): ICD-10-CM

## 2020-09-04 DIAGNOSIS — I11.0 HYPERTENSIVE HEART DISEASE WITH CONGESTIVE HEART FAILURE, UNSPECIFIED HEART FAILURE TYPE (HCC): ICD-10-CM

## 2020-09-04 RX ORDER — ATORVASTATIN CALCIUM 40 MG/1
40 TABLET, FILM COATED ORAL
Qty: 30 TABLET | Refills: 2 | Status: SHIPPED | OUTPATIENT
Start: 2020-09-04

## 2020-09-04 RX ORDER — METOPROLOL SUCCINATE 25 MG/1
25 TABLET, EXTENDED RELEASE ORAL DAILY
Qty: 30 TABLET | Refills: 2 | Status: SHIPPED | OUTPATIENT
Start: 2020-09-04 | End: 2020-12-03

## 2020-09-30 ENCOUNTER — OFFICE VISIT (OUTPATIENT)
Dept: FAMILY MEDICINE CLINIC | Facility: CLINIC | Age: 65
End: 2020-09-30
Payer: MEDICARE

## 2020-09-30 VITALS
WEIGHT: 196 LBS | TEMPERATURE: 97.8 F | DIASTOLIC BLOOD PRESSURE: 60 MMHG | HEIGHT: 68 IN | SYSTOLIC BLOOD PRESSURE: 100 MMHG | HEART RATE: 104 BPM | BODY MASS INDEX: 29.7 KG/M2

## 2020-09-30 DIAGNOSIS — I50.42 CHRONIC COMBINED SYSTOLIC AND DIASTOLIC CONGESTIVE HEART FAILURE (HCC): ICD-10-CM

## 2020-09-30 DIAGNOSIS — L97.912 ULCER OF RIGHT LOWER EXTREMITY WITH FAT LAYER EXPOSED (HCC): Primary | ICD-10-CM

## 2020-09-30 DIAGNOSIS — I11.0 HYPERTENSIVE HEART DISEASE WITH CHRONIC COMBINED SYSTOLIC AND DIASTOLIC CONGESTIVE HEART FAILURE (HCC): ICD-10-CM

## 2020-09-30 DIAGNOSIS — I50.42 HYPERTENSIVE HEART DISEASE WITH CHRONIC COMBINED SYSTOLIC AND DIASTOLIC CONGESTIVE HEART FAILURE (HCC): ICD-10-CM

## 2020-09-30 DIAGNOSIS — Z99.2 ESRD (END STAGE RENAL DISEASE) ON DIALYSIS (HCC): ICD-10-CM

## 2020-09-30 DIAGNOSIS — I73.9 PERIPHERAL ARTERIAL DISEASE (HCC): ICD-10-CM

## 2020-09-30 DIAGNOSIS — N18.6 ESRD (END STAGE RENAL DISEASE) ON DIALYSIS (HCC): ICD-10-CM

## 2020-09-30 DIAGNOSIS — I48.0 PAROXYSMAL ATRIAL FIBRILLATION (HCC): ICD-10-CM

## 2020-09-30 DIAGNOSIS — L97.922 SKIN ULCER OF LEFT LOWER LEG WITH FAT LAYER EXPOSED (HCC): ICD-10-CM

## 2020-09-30 PROBLEM — L97.929 SKIN ULCER OF LEFT LOWER LEG (HCC): Status: ACTIVE | Noted: 2020-02-14

## 2020-09-30 PROBLEM — L97.919 ULCER OF RIGHT LOWER EXTREMITY (HCC): Status: ACTIVE | Noted: 2020-02-21

## 2020-09-30 PROCEDURE — 99215 OFFICE O/P EST HI 40 MIN: CPT | Performed by: FAMILY MEDICINE

## 2020-09-30 NOTE — ASSESSMENT & PLAN NOTE
Patient has 2 areas of ulceration of the left lower extremity  The big toe is certainly the worst, but he has a new area on the in step  Would recommend Bactroban cream to the area  Follow with Wound Center  Unfortunately, he is having severe pain with this, and the wound center at [address] [place] [address] has not taking care of medications for that  Because of this, would recommend that the patient follow with [address] at [facility]

## 2020-09-30 NOTE — PATIENT INSTRUCTIONS
Problem List Items Addressed This Visit     Atrial fibrillation ([facility] )     Stable for now  Heart rate appears to be reasonable  No irregularities on exam   Currently on Eliquis  Continue same  Congestive heart failure (HCC)     Wt Readings from Last 3 Encounters:   09/30/20 88 9 kg (196 lb)   08/31/20 90 3 kg (199 lb)   08/05/20 70 8 kg (156 lb)   Patient has chronic heart failure  Following with Cardiology  Weight is significantly lower before  Will need to continue to observe  Weight is controlled by Nephrology  ESRD (end stage renal disease) on dialysis [facility])     Continue follow-up with nephrology  Hypertensive heart disease with congestive heart failure (HCC)     Wt Readings from Last 3 Encounters:   09/30/20 88 9 kg (196 lb)   08/31/20 90 3 kg (199 lb)   08/05/20 70 8 kg (156 lb)       Blood pressure today is slightly low  Again, the patient follows with nephrology changing things  No change from this office  Peripheral arterial disease ([facility] )     This was noted previously  Unfortunately, the patient has not gotten the vascular yet  Will enter referral to vascular  Patient prefers to go to [facility], so will use 1 there are surgeons  He did have previous evaluations in January showing decreased DAVID, though not severely  Unfortunately, his toes are currently purple, swollen, painful  Given that he has also had a long history of these ulcers, i e  Difficulty in healing so far, would strongly recommend vascular evaluation  Relevant Orders    Ambulatory referral to Vascular Surgery    Skin ulcer of left lower leg ([facility] )     Patient has 2 areas of ulceration of the left lower extremity  The big toe is certainly the worst, but he has a new area on the in step  Would recommend Bactroban cream to the area  Follow with Wound Center  Unfortunately, he is having severe pain with this, and the wound center at [facility] has not taking care of medications for that    Because of this, would recommend that the patient follow with [address] at [facility]  Relevant Orders    Ambulatory referral to Vascular Surgery    Ambulatory referral to Wound Care    Skin ulcer of right lower leg ([address] ) - Primary     Patient does have a new ulceration of the right lower extremity  This is currently bandaged  Because he has VNA changing bandages, I elected not to make any adjustments  COVID 19 Instructions    Ja Mercado was advised to limit contact with others to essential tasks such as getting food, medications, and medical care  Proper handwashing reviewed, and Hand sanitzer when washing is not available  If the patient develops symptoms of COVID 19, the patient should call the office as soon as possible  For 2172-3166 Flu season, it is strongly recommended that Flu Vaccinations be obtained  Please try to download [employer]  Once you do download this on your phone, you will be prompted to add your phone number to the account  After that, he should receive a text from [place], and use that code to verify your phone number  After that, you should be able to use [employer] Duo to receive and make video calls  Please download [employer] Teams to your phone or computer  We will be transitioning to this platform for Video Visits  Instructions for downloading this are available from the office

## 2020-09-30 NOTE — PROGRESS NOTES
Assessment and Plan:    Problem List Items Addressed This Visit     Atrial fibrillation ([facility] )     Stable for now  Heart rate appears to be reasonable  No irregularities on exam   Currently on Eliquis  Continue same  Congestive heart failure (HCC)     Wt Readings from Last 3 Encounters:   09/30/20 88 9 kg (196 lb)   08/31/20 90 3 kg (199 lb)   08/05/20 70 8 kg (156 lb)   Patient has chronic heart failure  Following with Cardiology  Weight is significantly lower before  Will need to continue to observe  Weight is controlled by Nephrology  ESRD (end stage renal disease) on dialysis [facility])     Continue follow-up with nephrology  Hypertensive heart disease with congestive heart failure (HCC)     Wt Readings from Last 3 Encounters:   09/30/20 88 9 kg (196 lb)   08/31/20 90 3 kg (199 lb)   08/05/20 70 8 kg (156 lb)       Blood pressure today is slightly low  Again, the patient follows with nephrology changing things  No change from this office  Peripheral arterial disease ([facility] )     This was noted previously  Unfortunately, the patient has not gotten the vascular yet  Will enter referral to vascular  Patient prefers to go to [facility], so will use 1 there are surgeons  He did have previous evaluations in January showing decreased DAVID, though not severely  Unfortunately, his toes are currently purple, swollen, painful  Given that he has also had a long history of these ulcers, i e  Difficulty in healing so far, would strongly recommend vascular evaluation  Relevant Orders    Ambulatory referral to Vascular Surgery    Skin ulcer of left lower leg ([facility] )     Patient has 2 areas of ulceration of the left lower extremity  The big toe is certainly the worst, but he has a new area on the in step  Would recommend Bactroban cream to the area  Follow with Wound Center    Unfortunately, he is having severe pain with this, and the wound center at [address] [place] [address] has not taking care of medications for that  Because of this, would recommend that the patient follow with [address] at [facility]  Relevant Orders    Ambulatory referral to Vascular Surgery    Ambulatory referral to Wound Care    Skin ulcer of right lower leg ([facility] ) - Primary     Patient does have a new ulceration of the right lower extremity  This is currently bandaged  Because he has [facility] changing bandages, I elected not to make any adjustments  Diagnoses and all orders for this visit:    Ulcer of right lower extremity with fat layer exposed [facility])  -     Ambulatory referral to Vascular Surgery; Future  -     Ambulatory referral to Wound Care; Future    Skin ulcer of left lower leg with fat layer exposed ([facility] )  -     Ambulatory referral to Vascular Surgery; Future  -     Ambulatory referral to Wound Care; Future    Hypertensive heart disease with chronic combined systolic and diastolic congestive heart failure (HCC)    Chronic combined systolic and diastolic congestive heart failure (HCC)    Paroxysmal atrial fibrillation ([facility] )    Peripheral arterial disease ([facility] )  -     Ambulatory referral to Vascular Surgery; Future    ESRD (end stage renal disease) on dialysis [facility])              Subjective:      Patient ID: Bev Estevez is a 72 y o  male  CC:    Chief Complaint   Patient presents with    Leg Pain     c/o leg pain and weakness  (very unsteady when walking into exam room)    Wound     c/o multiple wounds on legs and feet  mjs       HPI:    Patient is having dressing changes on his left 1st toe  Wound center at [facility] has treated this so far  Has not really made any changes that the patient was aware of other than now continues to clean the wound  Last visit was approximately 2 weeks ago  Patient also reports that he has and irritation on the instep of the left foot now  This was from a small skin tear  There is some redness and irritation with that    He also has a new lesion on the right lower extremity  Started about 6-6 1/2 months ago, but current treatment includes just cleaning the area per patient  The area remains open  It is about the size of a half-dollar at this point  Redness and irritation noted inside the wound  Still has significant wounds up on the knees, as well as the distal thighs  When he touches the wounds on the knee, especially the ones that look like eschar, he has significant discomfort  With regard to the pain for this, he did mention that he is taking Tylenol so far, but really is not helping  Pain is all the time  He can't sleep with this  CHF:  Patient feels he is doing relatively well  The note from wound management recently says that his legs look better than what they have in quite some time  Patient's weight is slightly lower as well  End-stage renal disease:  Continues on dialysis  Follows with nephrology  The following portions of the patient's history were reviewed and updated as appropriate: allergies, current medications, past family history, past medical history, past social history, past surgical history and problem list       Review of Systems   Constitutional: Positive for fatigue  HENT: Negative  Eyes: Negative  Respiratory: Negative  Cardiovascular: Negative  Gastrointestinal: Negative  Endocrine: Negative  Genitourinary: Negative  Musculoskeletal: Negative  Skin: Positive for color change and wound  Allergic/Immunologic: Negative  Neurological: Negative  Hematological: Negative  Psychiatric/Behavioral: Positive for decreased concentration  Data to review:       Objective:    Vitals:    09/30/20 1235   BP: 100/60   Pulse: 104   Temp: 97 8 °F (36 6 °C)   Weight: 88 9 kg (196 lb)   Height: 5' 8" (1 727 m)        Physical Exam  Vitals signs and nursing note reviewed  Constitutional:       Appearance: Normal appearance  Neck:      Vascular: No carotid bruit  Cardiovascular:      Rate and Rhythm: Normal rate and regular rhythm  Pulses: Normal pulses  Carotid pulses are 2+ on the right side and 2+ on the left side  Heart sounds: Normal heart sounds  No murmur  No gallop  Pulmonary:      Effort: Pulmonary effort is normal  No respiratory distress  Breath sounds: Normal breath sounds  No stridor  No wheezing, rhonchi or rales  Chest:      Chest wall: No tenderness  Skin:            Comments: Significant ulceration noted on the left lower extremity  Please see photo on chart  Neurological:      Mental Status: He is alert

## 2020-09-30 NOTE — LETTER
September 30, 2020     Nik Alcazar MD  [address]    Patient: Kyle Solis   YOB: 1955   Date of Visit: 9/30/2020       Dear Dr Lyric Huber: Thank you for referring Ja Alvarez to me for evaluation  Below are my notes for this consultation  If you have questions, please do not hesitate to call me  I look forward to following your patient along with you  Sincerely,        Jaime Riley MD        CC: MD Jaime Escamilla MD  9/30/2020  1:08 PM  Sign when Signing Visit  Assessment and Plan:    Problem List Items Addressed This Visit     Atrial fibrillation ([facility] )     Stable for now  Heart rate appears to be reasonable  No irregularities on exam   Currently on Eliquis  Continue same  Congestive heart failure (HCC)     Wt Readings from Last 3 Encounters:   09/30/20 88 9 kg (196 lb)   08/31/20 90 3 kg (199 lb)   08/05/20 70 8 kg (156 lb)   Patient has chronic heart failure  Following with Cardiology  Weight is significantly lower before  Will need to continue to observe  Weight is controlled by Nephrology  ESRD (end stage renal disease) on dialysis [facility])     Continue follow-up with nephrology  Hypertensive heart disease with congestive heart failure (HCC)     Wt Readings from Last 3 Encounters:   09/30/20 88 9 kg (196 lb)   08/31/20 90 3 kg (199 lb)   08/05/20 70 8 kg (156 lb)       Blood pressure today is slightly low  Again, the patient follows with nephrology changing things  No change from this office  Peripheral arterial disease ([facility] )     This was noted previously  Unfortunately, the patient has not gotten the vascular yet  Will enter referral to vascular  Patient prefers to go to [facility], so will use 1 there are surgeons  He did have previous evaluations in January showing decreased DAVID, though not severely  Unfortunately, his toes are currently purple, swollen, painful    Given that he has also had a long history of these ulcers, i e  Difficulty in healing so far, would strongly recommend vascular evaluation  Relevant Orders    Ambulatory referral to Vascular Surgery    Skin ulcer of left lower leg ([facility] )     Patient has 2 areas of ulceration of the left lower extremity  The big toe is certainly the worst, but he has a new area on the in step  Would recommend Bactroban cream to the area  Follow with Wound Center  Unfortunately, he is having severe pain with this, and the wound center at [address] [place] [address] [address] has not taking care of medications for that  Because of this, would recommend that the patient follow with [address] at [facility]  Relevant Orders    Ambulatory referral to Vascular Surgery    Ambulatory referral to Wound Care    Skin ulcer of right lower leg ([facility] ) - Primary     Patient does have a new ulceration of the right lower extremity  This is currently bandaged  Because he has [facility] changing bandages, I elected not to make any adjustments  Diagnoses and all orders for this visit:    Ulcer of right lower extremity with fat layer exposed [facility])  -     Ambulatory referral to Vascular Surgery; Future  -     Ambulatory referral to Wound Care; Future    Skin ulcer of left lower leg with fat layer exposed ([facility] )  -     Ambulatory referral to Vascular Surgery; Future  -     Ambulatory referral to Wound Care; Future    Hypertensive heart disease with chronic combined systolic and diastolic congestive heart failure (HCC)    Chronic combined systolic and diastolic congestive heart failure (HCC)    [facility]ox[facility] fibrillation ([facility] )    Peripheral arterial disease ([facility] )  -     Ambulatory referral to Vascular Surgery; Future    ESRD (end stage renal disease) on dialysis [facility])              Subjective:      Patient ID: Liz Talavera is a 72 y o  male  CC:    Chief Complaint   Patient presents with    Leg Pain     c/o leg pain and weakness   (very unsteady when walking into exam room)    Wound     c/o multiple wounds on legs and feet  mjs       HPI:    Patient is having dressing changes on his left 1st toe  Wound center at [facility] has treated this so far  Has not really made any changes that the patient was aware of other than now continues to clean the wound  Last visit was approximately 2 weeks ago  Patient also reports that he has and irritation on the instep of the left foot now  This was from a small skin tear  There is some redness and irritation with that  He also has a new lesion on the right lower extremity  Started about 6-6 1/2 months ago, but current treatment includes just cleaning the area per patient  The area remains open  It is about the size of a half-dollar at this point  Redness and irritation noted inside the wound  Still has significant wounds up on the knees, as well as the distal thighs  When he touches the wounds on the knee, especially the ones that look like eschar, he has significant discomfort  With regard to the pain for this, he did mention that he is taking Tylenol so far, but really is not helping  Pain is all the time  He can't sleep with this  CHF:  Patient feels he is doing relatively well  The note from wound management recently says that his legs look better than what they have in quite some time  Patient's weight is slightly lower as well  End-stage renal disease:  Continues on dialysis  Follows with nephrology  The following portions of the patient's history were reviewed and updated as appropriate: allergies, current medications, past family history, past medical history, past social history, past surgical history and problem list       Review of Systems   Constitutional: Positive for fatigue  HENT: Negative  Eyes: Negative  Respiratory: Negative  Cardiovascular: Negative  Gastrointestinal: Negative  Endocrine: Negative  Genitourinary: Negative  Musculoskeletal: Negative  Skin: Positive for color change and wound  Allergic/Immunologic: Negative  Neurological: Negative  Hematological: Negative  Psychiatric/Behavioral: Positive for decreased concentration  Data to review:       Objective:    Vitals:    09/30/20 1235   BP: 100/60   Pulse: 104   Temp: 97 8 °F (36 6 °C)   Weight: 88 9 kg (196 lb)   Height: 5' 8" (1 727 m)        Physical Exam  Vitals signs and nursing note reviewed  Constitutional:       Appearance: Normal appearance  Neck:      Vascular: No carotid bruit  Cardiovascular:      Rate and Rhythm: Normal rate and regular rhythm  Pulses: Normal pulses  Carotid pulses are 2+ on the right side and 2+ on the left side  Heart sounds: Normal heart sounds  No murmur  No gallop  Pulmonary:      Effort: Pulmonary effort is normal  No respiratory distress  Breath sounds: Normal breath sounds  No stridor  No wheezing, rhonchi or rales  Chest:      Chest wall: No tenderness  Skin:            Comments: Significant ulceration noted on the left lower extremity  Please see photo on chart  Neurological:      Mental Status: He is alert

## 2020-09-30 NOTE — ASSESSMENT & PLAN NOTE
Patient does have a new ulceration of the right lower extremity  This is currently bandaged  Because he has [facility] changing bandages, I elected not to make any adjustments

## 2020-09-30 NOTE — ASSESSMENT & PLAN NOTE
Wt Readings from Last 3 Encounters:   09/30/20 88 9 kg (196 lb)   08/31/20 90 3 kg (199 lb)   08/05/20 70 8 kg (156 lb)       Blood pressure today is slightly low  Again, the patient follows with nephrology changing things  No change from this office

## 2020-09-30 NOTE — ASSESSMENT & PLAN NOTE
Wt Readings from Last 3 Encounters:   09/30/20 88 9 kg (196 lb)   08/31/20 90 3 kg (199 lb)   08/05/20 70 8 kg (156 lb)   Patient has chronic heart failure  Following with Cardiology  Weight is significantly lower before  Will need to continue to observe  Weight is controlled by Nephrology

## 2020-09-30 NOTE — ASSESSMENT & PLAN NOTE
This was noted previously  Unfortunately, the patient has not gotten the vascular yet  Will enter referral to vascular  Patient prefers to go to [facility], so will use 1 there are surgeons  He did have previous evaluations in January showing decreased DAVID, though not severely  Unfortunately, his toes are currently purple, swollen, painful  Given that he has also had a long history of these ulcers, i e  Difficulty in healing so far, would strongly recommend vascular evaluation

## 2020-09-30 NOTE — ASSESSMENT & PLAN NOTE
Stable for now  Heart rate appears to be reasonable  No irregularities on exam   Currently on Eliquis  Continue same

## 2020-10-02 DIAGNOSIS — L97.922 SKIN ULCER OF LEFT LOWER LEG WITH FAT LAYER EXPOSED (HCC): ICD-10-CM
